# Patient Record
Sex: MALE | Race: WHITE | NOT HISPANIC OR LATINO | Employment: FULL TIME | URBAN - METROPOLITAN AREA
[De-identification: names, ages, dates, MRNs, and addresses within clinical notes are randomized per-mention and may not be internally consistent; named-entity substitution may affect disease eponyms.]

---

## 2018-09-02 ENCOUNTER — HOSPITAL ENCOUNTER (OUTPATIENT)
Facility: HOSPITAL | Age: 23
Discharge: HOME | End: 2018-09-03
Attending: EMERGENCY MEDICINE | Admitting: HOSPITALIST
Payer: MEDICAID

## 2018-09-02 DIAGNOSIS — S05.51XA INTRAOCULAR FOREIGN BODY OF RIGHT EYE, INITIAL ENCOUNTER: Primary | ICD-10-CM

## 2018-09-02 DIAGNOSIS — T15.91XA FOREIGN BODY, EYE, RIGHT, INITIAL ENCOUNTER: ICD-10-CM

## 2018-09-02 PROCEDURE — 90715 TDAP VACCINE 7 YRS/> IM: CPT | Performed by: STUDENT IN AN ORGANIZED HEALTH CARE EDUCATION/TRAINING PROGRAM

## 2018-09-02 PROCEDURE — G0378 HOSPITAL OBSERVATION PER HR: HCPCS

## 2018-09-02 PROCEDURE — 99284 EMERGENCY DEPT VISIT MOD MDM: CPT | Mod: ,,, | Performed by: EMERGENCY MEDICINE

## 2018-09-02 PROCEDURE — 63600175 PHARM REV CODE 636 W HCPCS: Performed by: STUDENT IN AN ORGANIZED HEALTH CARE EDUCATION/TRAINING PROGRAM

## 2018-09-02 PROCEDURE — 99220 PR INITIAL OBSERVATION CARE,LEVL III: CPT | Mod: ,,, | Performed by: PHYSICIAN ASSISTANT

## 2018-09-02 PROCEDURE — 90471 IMMUNIZATION ADMIN: CPT | Performed by: STUDENT IN AN ORGANIZED HEALTH CARE EDUCATION/TRAINING PROGRAM

## 2018-09-02 PROCEDURE — 99285 EMERGENCY DEPT VISIT HI MDM: CPT | Mod: 25

## 2018-09-02 PROCEDURE — 96365 THER/PROPH/DIAG IV INF INIT: CPT

## 2018-09-02 RX ORDER — ATROPINE SULFATE 10 MG/ML
1 SOLUTION/ DROPS OPHTHALMIC 2 TIMES DAILY
Status: DISCONTINUED | OUTPATIENT
Start: 2018-09-03 | End: 2018-09-03

## 2018-09-02 RX ORDER — ENOXAPARIN SODIUM 100 MG/ML
40 INJECTION SUBCUTANEOUS EVERY 24 HOURS
Status: DISCONTINUED | OUTPATIENT
Start: 2018-09-03 | End: 2018-09-03

## 2018-09-02 RX ADMIN — CLOSTRIDIUM TETANI TOXOID ANTIGEN (FORMALDEHYDE INACTIVATED), CORYNEBACTERIUM DIPHTHERIAE TOXOID ANTIGEN (FORMALDEHYDE INACTIVATED), BORDETELLA PERTUSSIS TOXOID ANTIGEN (GLUTARALDEHYDE INACTIVATED), BORDETELLA PERTUSSIS FILAMENTOUS HEMAGGLUTININ ANTIGEN (FORMALDEHYDE INACTIVATED), BORDETELLA PERTUSSIS PERTACTIN ANTIGEN, AND BORDETELLA PERTUSSIS FIMBRIAE 2/3 ANTIGEN 0.5 ML: 5; 2; 2.5; 5; 3; 5 INJECTION, SUSPENSION INTRAMUSCULAR at 10:09

## 2018-09-02 RX ADMIN — MOXIFLOXACIN HYDROCHLORIDE 400 MG: 400 INJECTION, SOLUTION INTRAVENOUS at 11:09

## 2018-09-03 ENCOUNTER — ANESTHESIA (OUTPATIENT)
Dept: SURGERY | Facility: HOSPITAL | Age: 23
End: 2018-09-03
Payer: MEDICAID

## 2018-09-03 ENCOUNTER — ANESTHESIA EVENT (OUTPATIENT)
Dept: SURGERY | Facility: HOSPITAL | Age: 23
End: 2018-09-03
Payer: MEDICAID

## 2018-09-03 VITALS
WEIGHT: 191.81 LBS | OXYGEN SATURATION: 98 % | BODY MASS INDEX: 22.65 KG/M2 | RESPIRATION RATE: 14 BRPM | DIASTOLIC BLOOD PRESSURE: 89 MMHG | TEMPERATURE: 100 F | HEIGHT: 77 IN | HEART RATE: 50 BPM | SYSTOLIC BLOOD PRESSURE: 148 MMHG

## 2018-09-03 PROBLEM — K21.9 GASTROESOPHAGEAL REFLUX DISEASE WITHOUT ESOPHAGITIS: Chronic | Status: ACTIVE | Noted: 2018-09-03

## 2018-09-03 PROBLEM — F17.210 CIGARETTE NICOTINE DEPENDENCE WITHOUT COMPLICATION: Chronic | Status: ACTIVE | Noted: 2018-09-03

## 2018-09-03 LAB
ALBUMIN SERPL BCP-MCNC: 3.8 G/DL
ALP SERPL-CCNC: 71 U/L
ALT SERPL W/O P-5'-P-CCNC: 43 U/L
ANION GAP SERPL CALC-SCNC: 9 MMOL/L
AST SERPL-CCNC: 26 U/L
BASOPHILS # BLD AUTO: 0.03 K/UL
BASOPHILS NFR BLD: 0.3 %
BILIRUB SERPL-MCNC: 0.5 MG/DL
BUN SERPL-MCNC: 7 MG/DL
CALCIUM SERPL-MCNC: 9.6 MG/DL
CHLORIDE SERPL-SCNC: 105 MMOL/L
CO2 SERPL-SCNC: 25 MMOL/L
CREAT SERPL-MCNC: 0.8 MG/DL
DIFFERENTIAL METHOD: ABNORMAL
EOSINOPHIL # BLD AUTO: 0.1 K/UL
EOSINOPHIL NFR BLD: 1.3 %
ERYTHROCYTE [DISTWIDTH] IN BLOOD BY AUTOMATED COUNT: 12.8 %
EST. GFR  (AFRICAN AMERICAN): >60 ML/MIN/1.73 M^2
EST. GFR  (NON AFRICAN AMERICAN): >60 ML/MIN/1.73 M^2
ESTIMATED AVG GLUCOSE: 94 MG/DL
GLUCOSE SERPL-MCNC: 94 MG/DL
GRAM STN SPEC: NORMAL
GRAM STN SPEC: NORMAL
HBA1C MFR BLD HPLC: 4.9 %
HCT VFR BLD AUTO: 39.1 %
HGB BLD-MCNC: 13.1 G/DL
IMM GRANULOCYTES # BLD AUTO: 0.03 K/UL
IMM GRANULOCYTES NFR BLD AUTO: 0.3 %
LYMPHOCYTES # BLD AUTO: 2.2 K/UL
LYMPHOCYTES NFR BLD: 21.7 %
MAGNESIUM SERPL-MCNC: 1.8 MG/DL
MCH RBC QN AUTO: 30.3 PG
MCHC RBC AUTO-ENTMCNC: 33.5 G/DL
MCV RBC AUTO: 91 FL
MONOCYTES # BLD AUTO: 1 K/UL
MONOCYTES NFR BLD: 9.9 %
NEUTROPHILS # BLD AUTO: 6.7 K/UL
NEUTROPHILS NFR BLD: 66.5 %
NRBC BLD-RTO: 0 /100 WBC
PHOSPHATE SERPL-MCNC: 4 MG/DL
PLATELET # BLD AUTO: 195 K/UL
PMV BLD AUTO: 11.7 FL
POTASSIUM SERPL-SCNC: 4 MMOL/L
PROT SERPL-MCNC: 6.9 G/DL
RBC # BLD AUTO: 4.32 M/UL
SODIUM SERPL-SCNC: 139 MMOL/L
WBC # BLD AUTO: 10.05 K/UL

## 2018-09-03 PROCEDURE — 25000003 PHARM REV CODE 250: Performed by: PHYSICIAN ASSISTANT

## 2018-09-03 PROCEDURE — 83036 HEMOGLOBIN GLYCOSYLATED A1C: CPT

## 2018-09-03 PROCEDURE — D9220A PRA ANESTHESIA: Mod: CRNA,,, | Performed by: NURSE ANESTHETIST, CERTIFIED REGISTERED

## 2018-09-03 PROCEDURE — 25000003 PHARM REV CODE 250: Performed by: NURSE ANESTHETIST, CERTIFIED REGISTERED

## 2018-09-03 PROCEDURE — 87102 FUNGUS ISOLATION CULTURE: CPT

## 2018-09-03 PROCEDURE — 27600004 OPTIME MED/SURG SUP & DEVICES INTRAOCULAR LENS: Performed by: OPHTHALMOLOGY

## 2018-09-03 PROCEDURE — 37000009 HC ANESTHESIA EA ADD 15 MINS: Performed by: OPHTHALMOLOGY

## 2018-09-03 PROCEDURE — 88300 SURGICAL PATH GROSS: CPT | Performed by: PATHOLOGY

## 2018-09-03 PROCEDURE — 36000707: Performed by: OPHTHALMOLOGY

## 2018-09-03 PROCEDURE — 27201423 OPTIME MED/SURG SUP & DEVICES STERILE SUPPLY: Performed by: OPHTHALMOLOGY

## 2018-09-03 PROCEDURE — C1814 RETINAL TAMP, SILICONE OIL: HCPCS | Performed by: OPHTHALMOLOGY

## 2018-09-03 PROCEDURE — D9220A PRA ANESTHESIA: Mod: ANES,,, | Performed by: ANESTHESIOLOGY

## 2018-09-03 PROCEDURE — 63600175 PHARM REV CODE 636 W HCPCS: Performed by: OPHTHALMOLOGY

## 2018-09-03 PROCEDURE — C1784 OCULAR DEV, INTRAOP, DET RET: HCPCS | Performed by: OPHTHALMOLOGY

## 2018-09-03 PROCEDURE — 37000008 HC ANESTHESIA 1ST 15 MINUTES: Performed by: OPHTHALMOLOGY

## 2018-09-03 PROCEDURE — 87116 MYCOBACTERIA CULTURE: CPT

## 2018-09-03 PROCEDURE — 71000033 HC RECOVERY, INTIAL HOUR: Performed by: OPHTHALMOLOGY

## 2018-09-03 PROCEDURE — 36415 COLL VENOUS BLD VENIPUNCTURE: CPT

## 2018-09-03 PROCEDURE — 87070 CULTURE OTHR SPECIMN AEROBIC: CPT

## 2018-09-03 PROCEDURE — 00145 ANES PX EYE VITREORTNL SURG: CPT | Performed by: OPHTHALMOLOGY

## 2018-09-03 PROCEDURE — 25000003 PHARM REV CODE 250: Performed by: STUDENT IN AN ORGANIZED HEALTH CARE EDUCATION/TRAINING PROGRAM

## 2018-09-03 PROCEDURE — 84100 ASSAY OF PHOSPHORUS: CPT

## 2018-09-03 PROCEDURE — 88300 SURGICAL PATH GROSS: CPT | Mod: 26,,, | Performed by: PATHOLOGY

## 2018-09-03 PROCEDURE — 63600175 PHARM REV CODE 636 W HCPCS: Performed by: PHYSICIAN ASSISTANT

## 2018-09-03 PROCEDURE — 65275 REPAIR OF EYE WOUND: CPT | Mod: 51,RT,, | Performed by: OPHTHALMOLOGY

## 2018-09-03 PROCEDURE — 83735 ASSAY OF MAGNESIUM: CPT

## 2018-09-03 PROCEDURE — 67113 REPAIR RETINAL DETACH CPLX: CPT | Mod: RT,,, | Performed by: OPHTHALMOLOGY

## 2018-09-03 PROCEDURE — 99226 PR SUBSEQUENT OBSERVATION CARE,LEVEL III: CPT | Mod: ,,, | Performed by: PHYSICIAN ASSISTANT

## 2018-09-03 PROCEDURE — 80053 COMPREHEN METABOLIC PANEL: CPT

## 2018-09-03 PROCEDURE — 87206 SMEAR FLUORESCENT/ACID STAI: CPT

## 2018-09-03 PROCEDURE — 63600175 PHARM REV CODE 636 W HCPCS: Performed by: NURSE ANESTHETIST, CERTIFIED REGISTERED

## 2018-09-03 PROCEDURE — 85025 COMPLETE CBC W/AUTO DIFF WBC: CPT

## 2018-09-03 PROCEDURE — 87075 CULTR BACTERIA EXCEPT BLOOD: CPT

## 2018-09-03 PROCEDURE — G0378 HOSPITAL OBSERVATION PER HR: HCPCS

## 2018-09-03 PROCEDURE — 25000003 PHARM REV CODE 250

## 2018-09-03 PROCEDURE — 65260 REMOVE FOREIGN BODY FROM EYE: CPT | Mod: 51,RT,, | Performed by: OPHTHALMOLOGY

## 2018-09-03 PROCEDURE — 25000003 PHARM REV CODE 250: Performed by: OPHTHALMOLOGY

## 2018-09-03 PROCEDURE — 36000706: Performed by: OPHTHALMOLOGY

## 2018-09-03 PROCEDURE — 87205 SMEAR GRAM STAIN: CPT

## 2018-09-03 RX ORDER — NEOMYCIN SULFATE, POLYMYXIN B SULFATE, AND DEXAMETHASONE 3.5; 10000; 1 MG/G; [USP'U]/G; MG/G
OINTMENT OPHTHALMIC
Status: DISCONTINUED | OUTPATIENT
Start: 2018-09-03 | End: 2018-09-03 | Stop reason: HOSPADM

## 2018-09-03 RX ORDER — NEOMYCIN SULFATE, POLYMYXIN B SULFATE, AND DEXAMETHASONE 3.5; 10000; 1 MG/G; [USP'U]/G; MG/G
OINTMENT OPHTHALMIC
Status: DISCONTINUED
Start: 2018-09-03 | End: 2018-09-03 | Stop reason: HOSPADM

## 2018-09-03 RX ORDER — MOXIFLOXACIN 5 MG/ML
SOLUTION/ DROPS OPHTHALMIC
Status: DISCONTINUED
Start: 2018-09-03 | End: 2018-09-03 | Stop reason: HOSPADM

## 2018-09-03 RX ORDER — PROPOFOL 10 MG/ML
VIAL (ML) INTRAVENOUS
Status: DISCONTINUED | OUTPATIENT
Start: 2018-09-03 | End: 2018-09-03

## 2018-09-03 RX ORDER — SODIUM CHLORIDE 0.9 % (FLUSH) 0.9 %
5 SYRINGE (ML) INJECTION
Status: DISCONTINUED | OUTPATIENT
Start: 2018-09-03 | End: 2018-09-03 | Stop reason: HOSPADM

## 2018-09-03 RX ORDER — HYDROCODONE BITARTRATE AND ACETAMINOPHEN 5; 325 MG/1; MG/1
TABLET ORAL
Status: COMPLETED
Start: 2018-09-03 | End: 2018-09-03

## 2018-09-03 RX ORDER — TETRACAINE HYDROCHLORIDE 5 MG/ML
SOLUTION OPHTHALMIC
Status: DISCONTINUED
Start: 2018-09-03 | End: 2018-09-03 | Stop reason: WASHOUT

## 2018-09-03 RX ORDER — NEOSTIGMINE METHYLSULFATE 1 MG/ML
INJECTION, SOLUTION INTRAVENOUS
Status: DISCONTINUED | OUTPATIENT
Start: 2018-09-03 | End: 2018-09-03

## 2018-09-03 RX ORDER — EPINEPHRINE 1 MG/ML
INJECTION, SOLUTION INTRACARDIAC; INTRAMUSCULAR; INTRAVENOUS; SUBCUTANEOUS
Status: DISCONTINUED | OUTPATIENT
Start: 2018-09-03 | End: 2018-09-03 | Stop reason: HOSPADM

## 2018-09-03 RX ORDER — GLYCOPYRROLATE 0.2 MG/ML
INJECTION INTRAMUSCULAR; INTRAVENOUS
Status: DISCONTINUED | OUTPATIENT
Start: 2018-09-03 | End: 2018-09-03

## 2018-09-03 RX ORDER — MIDAZOLAM HYDROCHLORIDE 1 MG/ML
INJECTION, SOLUTION INTRAMUSCULAR; INTRAVENOUS
Status: DISCONTINUED | OUTPATIENT
Start: 2018-09-03 | End: 2018-09-03

## 2018-09-03 RX ORDER — PHENYLEPHRINE HYDROCHLORIDE 25 MG/ML
1 SOLUTION/ DROPS OPHTHALMIC
Status: DISCONTINUED | OUTPATIENT
Start: 2018-09-03 | End: 2018-09-03 | Stop reason: HOSPADM

## 2018-09-03 RX ORDER — IBUPROFEN 200 MG
24 TABLET ORAL
Status: DISCONTINUED | OUTPATIENT
Start: 2018-09-03 | End: 2018-09-03 | Stop reason: HOSPADM

## 2018-09-03 RX ORDER — SUCRALFATE 1 G/1
1 TABLET ORAL 4 TIMES DAILY
Status: DISCONTINUED | OUTPATIENT
Start: 2018-09-03 | End: 2018-09-03 | Stop reason: HOSPADM

## 2018-09-03 RX ORDER — AMOXICILLIN 250 MG
1 CAPSULE ORAL 2 TIMES DAILY
Status: DISCONTINUED | OUTPATIENT
Start: 2018-09-03 | End: 2018-09-03 | Stop reason: HOSPADM

## 2018-09-03 RX ORDER — MOXIFLOXACIN HYDROCHLORIDE 400 MG/1
400 TABLET ORAL DAILY
Qty: 14 TABLET | Refills: 0 | Status: SHIPPED | OUTPATIENT
Start: 2018-09-03 | End: 2018-09-10 | Stop reason: SDUPTHER

## 2018-09-03 RX ORDER — GLUCAGON 1 MG
1 KIT INJECTION
Status: DISCONTINUED | OUTPATIENT
Start: 2018-09-03 | End: 2018-09-03 | Stop reason: HOSPADM

## 2018-09-03 RX ORDER — OXYCODONE AND ACETAMINOPHEN 5; 325 MG/1; MG/1
1 TABLET ORAL EVERY 6 HOURS PRN
Qty: 12 TABLET | Refills: 0 | Status: SHIPPED | OUTPATIENT
Start: 2018-09-03 | End: 2018-09-10 | Stop reason: SDUPTHER

## 2018-09-03 RX ORDER — ONDANSETRON 4 MG/1
4 TABLET, FILM COATED ORAL EVERY 8 HOURS PRN
Qty: 12 TABLET | Refills: 0 | OUTPATIENT
Start: 2018-09-03 | End: 2018-09-10

## 2018-09-03 RX ORDER — DEXAMETHASONE SODIUM PHOSPHATE 4 MG/ML
INJECTION, SOLUTION INTRA-ARTICULAR; INTRALESIONAL; INTRAMUSCULAR; INTRAVENOUS; SOFT TISSUE
Status: DISCONTINUED | OUTPATIENT
Start: 2018-09-03 | End: 2018-09-03 | Stop reason: HOSPADM

## 2018-09-03 RX ORDER — ONDANSETRON 8 MG/1
8 TABLET, ORALLY DISINTEGRATING ORAL EVERY 8 HOURS PRN
Status: DISCONTINUED | OUTPATIENT
Start: 2018-09-03 | End: 2018-09-03 | Stop reason: HOSPADM

## 2018-09-03 RX ORDER — ACETAMINOPHEN 325 MG/1
650 TABLET ORAL EVERY 4 HOURS PRN
Status: DISCONTINUED | OUTPATIENT
Start: 2018-09-03 | End: 2018-09-03

## 2018-09-03 RX ORDER — LIDOCAINE HYDROCHLORIDE 20 MG/ML
INJECTION, SOLUTION INFILTRATION; PERINEURAL
Status: DISCONTINUED
Start: 2018-09-03 | End: 2018-09-03 | Stop reason: WASHOUT

## 2018-09-03 RX ORDER — FENTANYL CITRATE 50 UG/ML
INJECTION, SOLUTION INTRAMUSCULAR; INTRAVENOUS
Status: DISCONTINUED | OUTPATIENT
Start: 2018-09-03 | End: 2018-09-03

## 2018-09-03 RX ORDER — IPRATROPIUM BROMIDE AND ALBUTEROL SULFATE 2.5; .5 MG/3ML; MG/3ML
3 SOLUTION RESPIRATORY (INHALATION) EVERY 4 HOURS PRN
Status: DISCONTINUED | OUTPATIENT
Start: 2018-09-03 | End: 2018-09-03 | Stop reason: HOSPADM

## 2018-09-03 RX ORDER — RAMELTEON 8 MG/1
8 TABLET ORAL NIGHTLY PRN
Status: DISCONTINUED | OUTPATIENT
Start: 2018-09-03 | End: 2018-09-03 | Stop reason: HOSPADM

## 2018-09-03 RX ORDER — FENTANYL CITRATE 50 UG/ML
25 INJECTION, SOLUTION INTRAMUSCULAR; INTRAVENOUS EVERY 5 MIN PRN
Status: DISCONTINUED | OUTPATIENT
Start: 2018-09-03 | End: 2018-09-03 | Stop reason: HOSPADM

## 2018-09-03 RX ORDER — OXYCODONE AND ACETAMINOPHEN 5; 325 MG/1; MG/1
1 TABLET ORAL EVERY 6 HOURS PRN
Qty: 12 TABLET | Refills: 0 | Status: SHIPPED | OUTPATIENT
Start: 2018-09-03 | End: 2018-09-10 | Stop reason: ALTCHOICE

## 2018-09-03 RX ORDER — SUCCINYLCHOLINE CHLORIDE 20 MG/ML
INJECTION INTRAMUSCULAR; INTRAVENOUS
Status: DISCONTINUED | OUTPATIENT
Start: 2018-09-03 | End: 2018-09-03

## 2018-09-03 RX ORDER — DEXAMETHASONE SODIUM PHOSPHATE 4 MG/ML
INJECTION, SOLUTION INTRA-ARTICULAR; INTRALESIONAL; INTRAMUSCULAR; INTRAVENOUS; SOFT TISSUE
Status: DISCONTINUED
Start: 2018-09-03 | End: 2018-09-03 | Stop reason: HOSPADM

## 2018-09-03 RX ORDER — KETOROLAC TROMETHAMINE 30 MG/ML
15 INJECTION, SOLUTION INTRAMUSCULAR; INTRAVENOUS EVERY 6 HOURS PRN
Status: DISCONTINUED | OUTPATIENT
Start: 2018-09-03 | End: 2018-09-03 | Stop reason: HOSPADM

## 2018-09-03 RX ORDER — PANTOPRAZOLE SODIUM 40 MG/1
40 TABLET, DELAYED RELEASE ORAL DAILY
Status: DISCONTINUED | OUTPATIENT
Start: 2018-09-03 | End: 2018-09-03 | Stop reason: HOSPADM

## 2018-09-03 RX ORDER — LIDOCAINE HYDROCHLORIDE 20 MG/ML
INJECTION, SOLUTION EPIDURAL; INFILTRATION; INTRACAUDAL; PERINEURAL
Status: DISCONTINUED
Start: 2018-09-03 | End: 2018-09-03 | Stop reason: WASHOUT

## 2018-09-03 RX ORDER — HYDROCODONE BITARTRATE AND ACETAMINOPHEN 5; 325 MG/1; MG/1
1 TABLET ORAL EVERY 4 HOURS PRN
Status: DISCONTINUED | OUTPATIENT
Start: 2018-09-03 | End: 2018-09-03 | Stop reason: HOSPADM

## 2018-09-03 RX ORDER — VANCOMYCIN HYDROCHLORIDE 500 MG/10ML
INJECTION, POWDER, LYOPHILIZED, FOR SOLUTION INTRAVENOUS
Status: DISCONTINUED | OUTPATIENT
Start: 2018-09-03 | End: 2018-09-03 | Stop reason: HOSPADM

## 2018-09-03 RX ORDER — PHENYLEPHRINE HYDROCHLORIDE 100 MG/ML
SOLUTION/ DROPS OPHTHALMIC
Status: DISCONTINUED
Start: 2018-09-03 | End: 2018-09-03 | Stop reason: HOSPADM

## 2018-09-03 RX ORDER — LIDOCAINE HYDROCHLORIDE 10 MG/ML
1 INJECTION, SOLUTION EPIDURAL; INFILTRATION; INTRACAUDAL; PERINEURAL ONCE
Status: DISCONTINUED | OUTPATIENT
Start: 2018-09-03 | End: 2018-09-03 | Stop reason: HOSPADM

## 2018-09-03 RX ORDER — PROMETHAZINE HYDROCHLORIDE 25 MG/1
25 TABLET ORAL EVERY 6 HOURS PRN
Status: DISCONTINUED | OUTPATIENT
Start: 2018-09-03 | End: 2018-09-03 | Stop reason: HOSPADM

## 2018-09-03 RX ORDER — ONDANSETRON 4 MG/1
4 TABLET, FILM COATED ORAL EVERY 8 HOURS PRN
Qty: 12 TABLET | Refills: 0 | Status: SHIPPED | OUTPATIENT
Start: 2018-09-03 | End: 2018-09-10

## 2018-09-03 RX ORDER — TROPICAMIDE 5 MG/ML
SOLUTION/ DROPS OPHTHALMIC
Status: DISCONTINUED
Start: 2018-09-03 | End: 2018-09-03 | Stop reason: HOSPADM

## 2018-09-03 RX ORDER — PREDNISOLONE ACETATE 10 MG/ML
SUSPENSION/ DROPS OPHTHALMIC
Status: DISCONTINUED
Start: 2018-09-03 | End: 2018-09-03 | Stop reason: HOSPADM

## 2018-09-03 RX ORDER — EPINEPHRINE 1 MG/ML
INJECTION, SOLUTION INTRACARDIAC; INTRAMUSCULAR; INTRAVENOUS; SUBCUTANEOUS
Status: DISCONTINUED
Start: 2018-09-03 | End: 2018-09-03 | Stop reason: HOSPADM

## 2018-09-03 RX ORDER — TROPICAMIDE 10 MG/ML
1 SOLUTION/ DROPS OPHTHALMIC
Status: DISCONTINUED | OUTPATIENT
Start: 2018-09-03 | End: 2018-09-03 | Stop reason: HOSPADM

## 2018-09-03 RX ORDER — LIDOCAINE HYDROCHLORIDE 10 MG/ML
INJECTION INFILTRATION; PERINEURAL
Status: DISCONTINUED
Start: 2018-09-03 | End: 2018-09-03 | Stop reason: WASHOUT

## 2018-09-03 RX ORDER — VANCOMYCIN HYDROCHLORIDE 1 G/20ML
INJECTION, POWDER, LYOPHILIZED, FOR SOLUTION INTRAVENOUS
Status: DISCONTINUED
Start: 2018-09-03 | End: 2018-09-03 | Stop reason: WASHOUT

## 2018-09-03 RX ORDER — IBUPROFEN 200 MG
16 TABLET ORAL
Status: DISCONTINUED | OUTPATIENT
Start: 2018-09-03 | End: 2018-09-03 | Stop reason: HOSPADM

## 2018-09-03 RX ORDER — ACETAMINOPHEN 325 MG/1
650 TABLET ORAL EVERY 4 HOURS PRN
Status: DISCONTINUED | OUTPATIENT
Start: 2018-09-03 | End: 2018-09-03 | Stop reason: HOSPADM

## 2018-09-03 RX ORDER — VANCOMYCIN HYDROCHLORIDE 500 MG/10ML
INJECTION, POWDER, LYOPHILIZED, FOR SOLUTION INTRAVENOUS
Status: DISCONTINUED
Start: 2018-09-03 | End: 2018-09-03 | Stop reason: HOSPADM

## 2018-09-03 RX ORDER — BUPIVACAINE HYDROCHLORIDE 7.5 MG/ML
INJECTION, SOLUTION EPIDURAL; RETROBULBAR
Status: DISCONTINUED
Start: 2018-09-03 | End: 2018-09-03 | Stop reason: WASHOUT

## 2018-09-03 RX ORDER — MOXIFLOXACIN HYDROCHLORIDE 400 MG/1
400 TABLET ORAL DAILY
Qty: 10 TABLET | Refills: 0 | Status: SHIPPED | OUTPATIENT
Start: 2018-09-03 | End: 2018-09-13

## 2018-09-03 RX ORDER — DEXAMETHASONE SODIUM PHOSPHATE 4 MG/ML
INJECTION, SOLUTION INTRA-ARTICULAR; INTRALESIONAL; INTRAMUSCULAR; INTRAVENOUS; SOFT TISSUE
Status: DISCONTINUED | OUTPATIENT
Start: 2018-09-03 | End: 2018-09-03

## 2018-09-03 RX ORDER — ONDANSETRON 8 MG/1
8 TABLET, ORALLY DISINTEGRATING ORAL EVERY 8 HOURS PRN
Status: DISCONTINUED | OUTPATIENT
Start: 2018-09-03 | End: 2018-09-03

## 2018-09-03 RX ORDER — ROCURONIUM BROMIDE 10 MG/ML
INJECTION, SOLUTION INTRAVENOUS
Status: DISCONTINUED | OUTPATIENT
Start: 2018-09-03 | End: 2018-09-03

## 2018-09-03 RX ORDER — MORPHINE SULFATE 20 MG/ML
10 SOLUTION ORAL EVERY 6 HOURS PRN
Status: DISCONTINUED | OUTPATIENT
Start: 2018-09-03 | End: 2018-09-03 | Stop reason: HOSPADM

## 2018-09-03 RX ORDER — ONDANSETRON 2 MG/ML
INJECTION INTRAMUSCULAR; INTRAVENOUS
Status: DISCONTINUED | OUTPATIENT
Start: 2018-09-03 | End: 2018-09-03

## 2018-09-03 RX ORDER — LIDOCAINE HCL/PF 100 MG/5ML
SYRINGE (ML) INTRAVENOUS
Status: DISCONTINUED | OUTPATIENT
Start: 2018-09-03 | End: 2018-09-03

## 2018-09-03 RX ORDER — TETRACAINE HYDROCHLORIDE 5 MG/ML
1 SOLUTION OPHTHALMIC
Status: DISCONTINUED | OUTPATIENT
Start: 2018-09-03 | End: 2018-09-03 | Stop reason: HOSPADM

## 2018-09-03 RX ORDER — IBUPROFEN 200 MG
1 TABLET ORAL DAILY PRN
Status: DISCONTINUED | OUTPATIENT
Start: 2018-09-03 | End: 2018-09-03 | Stop reason: HOSPADM

## 2018-09-03 RX ADMIN — HYDROCODONE BITARTRATE AND ACETAMINOPHEN 1 TABLET: 5; 325 TABLET ORAL at 01:09

## 2018-09-03 RX ADMIN — LIDOCAINE HYDROCHLORIDE 80 MG: 20 INJECTION, SOLUTION INTRAVENOUS at 11:09

## 2018-09-03 RX ADMIN — SENNOSIDES AND DOCUSATE SODIUM 1 TABLET: 8.6; 5 TABLET ORAL at 01:09

## 2018-09-03 RX ADMIN — FENTANYL CITRATE 25 MCG: 50 INJECTION, SOLUTION INTRAMUSCULAR; INTRAVENOUS at 01:09

## 2018-09-03 RX ADMIN — GLYCOPYRROLATE 0.4 MG: 0.2 INJECTION INTRAMUSCULAR; INTRAVENOUS at 12:09

## 2018-09-03 RX ADMIN — MORPHINE SULFATE 10 MG: 100 SOLUTION ORAL at 01:09

## 2018-09-03 RX ADMIN — PROPOFOL 200 MG: 10 INJECTION, EMULSION INTRAVENOUS at 11:09

## 2018-09-03 RX ADMIN — FENTANYL CITRATE 25 MCG: 50 INJECTION, SOLUTION INTRAMUSCULAR; INTRAVENOUS at 11:09

## 2018-09-03 RX ADMIN — DEXAMETHASONE SODIUM PHOSPHATE 8 MG: 4 INJECTION, SOLUTION INTRAMUSCULAR; INTRAVENOUS at 11:09

## 2018-09-03 RX ADMIN — KETOROLAC TROMETHAMINE 15 MG: 30 INJECTION, SOLUTION INTRAMUSCULAR at 08:09

## 2018-09-03 RX ADMIN — SUCCINYLCHOLINE CHLORIDE 100 MG: 20 INJECTION, SOLUTION INTRAMUSCULAR; INTRAVENOUS at 11:09

## 2018-09-03 RX ADMIN — ONDANSETRON 4 MG: 2 INJECTION INTRAMUSCULAR; INTRAVENOUS at 12:09

## 2018-09-03 RX ADMIN — ROCURONIUM BROMIDE 10 MG: 10 INJECTION, SOLUTION INTRAVENOUS at 11:09

## 2018-09-03 RX ADMIN — NEOSTIGMINE METHYLSULFATE 3 MG: 1 INJECTION INTRAVENOUS at 12:09

## 2018-09-03 RX ADMIN — MIDAZOLAM HYDROCHLORIDE 2 MG: 1 INJECTION, SOLUTION INTRAMUSCULAR; INTRAVENOUS at 10:09

## 2018-09-03 RX ADMIN — SODIUM CHLORIDE, SODIUM GLUCONATE, SODIUM ACETATE, POTASSIUM CHLORIDE, MAGNESIUM CHLORIDE, SODIUM PHOSPHATE, DIBASIC, AND POTASSIUM PHOSPHATE: .53; .5; .37; .037; .03; .012; .00082 INJECTION, SOLUTION INTRAVENOUS at 10:09

## 2018-09-03 RX ADMIN — FENTANYL CITRATE 25 MCG: 50 INJECTION, SOLUTION INTRAMUSCULAR; INTRAVENOUS at 10:09

## 2018-09-03 RX ADMIN — KETOROLAC TROMETHAMINE 15 MG: 30 INJECTION, SOLUTION INTRAMUSCULAR at 02:09

## 2018-09-03 NOTE — ED TRIAGE NOTES
Pt reports to ED via EMS with c/o right eye injury. Pt reports metal got in his eye at work Friday. Pt transfer from [facility] and received 2mg morphine, 1mg Dilaudid. Pt report no vision in right eye, pain 4/10. PT denies h/a.

## 2018-09-03 NOTE — H&P
Pre-Operative History & Physical  Ophthalmology      SUBJECTIVE:     History of Present Illness:  Patient is a 23 y.o. male presents with Intraocular foreign body of right eye, initial encounter [S05.51XA].    MEDICATIONS:   (Not in a hospital admission)    ALLERGIES: Review of patient's allergies indicates:  No Known Allergies    PAST MEDICAL HISTORY: History reviewed. No pertinent past medical history.  PAST SURGICAL HISTORY:   Past Surgical History:   Procedure Laterality Date    LYMPH NODE BIOPSY      benign    TONSILLECTOMY       PAST FAMILY HISTORY:   Family History   Problem Relation Age of Onset    Cancer Maternal Grandfather         lung cancer, also brain involvement     SOCIAL HISTORY:   Social History     Tobacco Use    Smoking status: Current Every Day Smoker     Packs/day: 1.00    Smokeless tobacco: Current User   Substance Use Topics    Alcohol use: No    Drug use: No        MENTAL STATUS: Alert    REVIEW OF SYSTEMS: Negative    OBJECTIVE:     Vital Signs (Most Recent)  Temp: 98.4 °F (36.9 °C) (09/02/18 2123)  Pulse: 109 (09/02/18 2123)  Resp: 18 (09/02/18 2123)  BP: (!) 128/102 (09/02/18 2123)  SpO2: 98 % (09/02/18 2123)    Physical Exam:  General: NAD  HEENT: see clinic note for full details  Lungs: Adequate respirations  Heart: + pulses  Abdomen: Soft    ASSESSMENT/PLAN:     Patient is a 23 y.o. male with Intraocular foreign body of right eye, initial encounter [S05.51XA].     - Plan for PPV/PPL/FB removal OD    - Risks/benefits/alternatives of the procedure including, but not limited to scarring, bleeding, infection, loss or decreased vision, and/or need for possible repeat surgery discussed with the patient and family.   - Informed consent obtained prior to surgery and the patient/family voiced good understanding.

## 2018-09-03 NOTE — PROGRESS NOTES
[facility] Hospital Medicine  Progress Note    Patient Name: Nirmal Oglesby  MRN: [id number]  Patient Class: OP- Observation   Admission Date: 9/2/2018  Length of Stay: 0 days  Attending Physician: Lamar Ramos MD  Primary Care Provider: Octavia Best MD    [facility] Medicine Team: [facility] E Isela Valdes PA-C    Subjective:     Principal Problem:Intraocular foreign body of right eye    HPI:  Patient is a 23 year old gentleman with a h/o tobacco abuse.  He was transferred from [facility] secondary to an intraocular foreign body.  Patient states that he was hammering on Friday and felt something go into his right eye.  He attempted to irrigate it afterwards.  Initially, eye was painful and patient noted blurred vision.  This morning, vision in right eye worsened to complete darkness.  He currently complains of mild pain to the eye.  He denies chest pain, SOB, dizziness, palpitations, fever/chills, N/V/D.  Patient does report a history of a brain mass about four years ago, but did not follow-up.    Hospital Course:  Nirmal Oglesby was placed in observation for acute vision loss due to intraocular foreign body. Opthalmology to perform surgery 9/3/2018.    Interval History: Pt. Resting in bed, family at bedside. All questions and concerns addressed.     Review of Systems   Constitutional: Negative for chills and fever.   HENT: Negative for congestion.    Eyes: Positive for visual disturbance. Negative for pain.        R vision loss   Cardiovascular: Negative for chest pain.   Gastrointestinal: Negative for abdominal distention and abdominal pain.   Genitourinary: Negative for difficulty urinating and dysuria.   Musculoskeletal: Negative for arthralgias and back pain.   Allergic/Immunologic: Negative for immunocompromised state.   Neurological: Negative for dizziness, weakness and headaches.   Psychiatric/Behavioral: Negative for agitation.     Objective:     Vital Signs  (Most Recent):  Temp: 96.7 °F (35.9 °C) (09/03/18 0753)  Pulse: (!) 48 (09/03/18 0753)  Resp: 14 (09/03/18 0753)  BP: 130/68 (09/03/18 0753)  SpO2: 99 % (09/03/18 0753) Vital Signs (24h Range):  Temp:  [96.7 °F (35.9 °C)-98.5 °F (36.9 °C)] 96.7 °F (35.9 °C)  Pulse:  [] 48  Resp:  [14-18] 14  SpO2:  [97 %-100 %] 99 %  BP: (123-139)/() 130/68     Weight: 87 kg (191 lb 12.8 oz)  Body mass index is 22.74 kg/m².    Intake/Output Summary (Last 24 hours) at 9/3/2018 0811  Last data filed at 9/3/2018 0015  Gross per 24 hour   Intake 250 ml   Output --   Net 250 ml      Physical Exam   Constitutional: He is oriented to person, place, and time. He appears well-developed and well-nourished. No distress.   HENT:   Head: Normocephalic and atraumatic.   Eyes:   Right eye shield in place   Neck: Neck supple. Carotid bruit is not present. No thyromegaly present.   Cardiovascular: Normal rate and regular rhythm. Exam reveals no gallop.   No murmur heard.  Pulmonary/Chest: Effort normal and breath sounds normal. No respiratory distress. He has no wheezes.   Abdominal: Bowel sounds are normal. He exhibits no distension. There is no splenomegaly or hepatomegaly. There is no tenderness.   Musculoskeletal: Normal range of motion. He exhibits no edema.   Neurological: He is alert and oriented to person, place, and time. No cranial nerve deficit or sensory deficit.   Skin: Skin is warm and dry. No rash noted.   Psychiatric: He has a normal mood and affect. His behavior is normal.   Nursing note and vitals reviewed.      Significant Labs: All pertinent labs within the past 24 hours have been reviewed.    Significant Imaging: I have reviewed all pertinent imaging results/findings within the past 24 hours.    Assessment/Plan:      * Intraocular foreign body of right eye    Vitrectomy today per Opthalmology   - CT showed intraocular metallic foreign body.  - Appreciate ophthalmology's assistance.   - NPO, Atropine BID right eye, IV  moxifloxacin.  - Patient's tetanus updated in ER.  - Supportive care.        Gastroesophageal reflux disease without esophagitis    - Continue Protonix 20mg daily and sucralfate 1g QID.          Cigarette nicotine dependence without complication    Pt reports 1-2 pks/day, not interested in quitting at this time  - Complete cessation recommended.  Nicoderm patch PRN.          VTE Risk Mitigation (From admission, onward)    None              Isela Valdes PA-C  Department of Hospital Medicine   [facility]

## 2018-09-03 NOTE — ASSESSMENT & PLAN NOTE
Vitrectomy today per Opthalmology   - CT showed intraocular metallic foreign body.  - Appreciate ophthalmology's assistance.   - NPO, Atropine BID right eye, IV moxifloxacin.  - Patient's tetanus updated in ER.  - Supportive care.

## 2018-09-03 NOTE — ANESTHESIA POSTPROCEDURE EVALUATION
"Anesthesia Post Evaluation    Patient: Nirmal Oglesby    Procedure(s) Performed: Procedure(s) (LRB):  VITRECTOMY, PARS PLANA APPROACH (Right)    Final Anesthesia Type: general  Patient location during evaluation: PACU  Patient participation: Yes- Able to Participate  Level of consciousness: awake and alert  Post-procedure vital signs: reviewed and stable  Pain management: adequate  Airway patency: patent  PONV status at discharge: No PONV  Anesthetic complications: no      Cardiovascular status: blood pressure returned to baseline and hemodynamically stable  Respiratory status: unassisted and spontaneous ventilation  Hydration status: euvolemic  Follow-up not needed.        Visit Vitals  BP (!) 148/89   Pulse (!) 50   Temp 37.7 °C (99.9 °F) (Temporal)   Resp 14   Ht 6' 5" (1.956 m)   Wt 87 kg (191 lb 12.8 oz)   SpO2 98%   BMI 22.74 kg/m²       Pain/Casey Score: Pain Assessment Performed: Yes (9/3/2018  1:30 PM)  Presence of Pain: complains of pain/discomfort (9/3/2018  1:30 PM)  Pain Rating Prior to Med Admin: 10 (9/3/2018  2:56 PM)  Pain Rating Post Med Admin: 4 (9/3/2018  2:00 PM)  Casey Score: 9 (9/3/2018  1:30 PM)        "

## 2018-09-03 NOTE — TRANSFER OF CARE
"Anesthesia Transfer of Care Note    Patient: Nirmal Oglesby    Procedure(s) Performed: Procedure(s) (LRB):  VITRECTOMY, PARS PLANA APPROACH (Right)    Patient location: PACU    Anesthesia Type: general    Transport from OR: Transported from OR on 6-10 L/min O2 by face mask with adequate spontaneous ventilation    Post pain: pain needs to be addressed    Post assessment: no apparent anesthetic complications and tolerated procedure well    Post vital signs: stable    Level of consciousness: awake    Nausea/Vomiting: no nausea/vomiting    Complications: none    Transfer of care protocol was followed    PIV became dislodged prior to transport. CRNA placed new IV on arrival to PACU; see LDA. Fentanyl 50 mcg administered by CRNA once new PIV in place.    Last vitals:   Visit Vitals  BP (!) 123/58 (BP Location: Left arm, Patient Position: Lying)   Pulse 64   Temp 37.7 °C (99.9 °F) (Temporal)   Resp 14   Ht 6' 5" (1.956 m)   Wt 87 kg (191 lb 12.8 oz)   SpO2 96%   BMI 22.74 kg/m²     "

## 2018-09-03 NOTE — HOSPITAL COURSE
Nirmal Oglesby was placed in observation for acute vision loss due to intraocular foreign body. Opthalmology to perform surgery 9/3/2018.

## 2018-09-03 NOTE — CONSULTS
[facility]  Ophthalmology  Consult Note    Patient Name: Nirmal Oglesby  MRN: [id number]  Admission Date: 9/2/2018  Hospital Length of Stay: 0 days  Attending Provider: Mina Julian MD   Primary Care Physician: Octavia Best MD  Principal Problem:<principal problem not specified>    Inpatient consult to Ophthalmology  Consult performed by: Kenyatta Mcintosh MD  Consult ordered by: Mina Julian MD        Subjective:     Chief Complaint:  Loss of vision OD    HPI:   Mr. Oglesby is a 22 y/o male who presents to [facility] as a transfer from [facility] with a history of Intraocular foreign body as seen on CT from OSH. Pt was hammering a hammer (unknown composition) and felt something go into his eye. No eye protection. Attempted to irrigate immediately after. Reports blurry vision immediately following the incident that worsened to complete darkness this morning.     Last meal at noon, last drank at 4 pm Coke   NKDA  PMHx: HTN diet controlled, brain mass that is being observed (?)  POHx: none, no history of eye crossing or diplopia    No new subjective & objective note has been filed under this hospital service since the last note was generated.      Base Eye Exam     Visual Acuity (Snellen - Linear)       Right Left    Dist sc LP 20/25          Tonometry (Tonopen, 10:17 PM)       Right Left    Pressure 7 9          Pupils       Dark Light Shape React APD    Right 3.5 3 Round Minimal 1+    Left 4 2 Round Brisk None          Extraocular Movement       Right Left     -- -- --   0  0   -- -1 --    0 0 0   0  0   0 0 0      RXT           Dilation     Both eyes:  1.0% Mydriacyl, 0.5% Mydriacyl @ 10:11 PM            Slit Lamp and Fundus Exam     External Exam       Right Left    External Normal Normal          Slit Lamp Exam       Right Left    Lids/Lashes trace edema, TTP, mild erythema Normal    Conjunctiva/Sclera 2+ injection White and quiet    Cornea Sealed laceration at 9 oclock near  limbus, Hermelinda negative, MCE temporally Clear    Anterior Chamber 3+ pigmented cells Deep and quiet    Iris Iris defect at 9 oclock Round and reactive    Lens traumatic cataract in central axis Clear    Vitreous No view Normal          Fundus Exam       Right Left    Disc No view Normal    C/D Ratio  0.1    Macula No view Normal    Vessels No view Normal    Periphery No view Normal              Assessment and Plan:     Intraocular foreign body of right eye    - 22 yo M presenting with loss of vision OD and eye pain OD x 2 days after working with metal at 9 am on 8/31  - Seen initially at [facility] and CT scan revealed intraocular metallic foreign body. Globes formed  - [facility] LP, IOP 7, minimal pupil reaction with +APD  - Anterior exam shows entry wound at 9 oclock near limbus, 3+ AC rxn, with traumatic cataract  - No view to posterior chamber d/t cataract  - Given LP status, recommend urgent removal of IOFB and patient amenable to surgery  - Informed consent obtained after extensive risks/benefits/alternatives were discussed with the patient including but not limited to pain, bleeding, infection, loss of vision, loss of the eye, asymmetry, need for revision in future, scarring.  Alternatives such as observation were discussed.  All questions were answered.    - Update tetanus  - Start IV moxifloxacin   - Admit to obs  - Eye shield applied  - NPO after midnight  - Atropine BID right eye    To OR in AM              Seen with Dr. Caballero    Thank you for your consult. I will follow-up with patient. Please contact us if you have any additional questions.    Kenyatta Mcintosh MD  Ophthalmology  [facility]

## 2018-09-03 NOTE — PROGRESS NOTES
Patient arrived to floor from ED via wheelchair. Patient is awake, alert, and oriented. Skin is warm and dry. Neurovascularly intact. Patch noted over right eye, and patient complains of discomfort to the right eye. Lungs clear in all fields. Abdomen is soft and nontender. Bowel sounds present in all quadrants. NPO status in progress as patient is aware that he is going to surgery today. Questions encouraged. Oriented to environment. Will continue to monitor.

## 2018-09-03 NOTE — SUBJECTIVE & OBJECTIVE
History reviewed. No pertinent past medical history.    Past Surgical History:   Procedure Laterality Date    LYMPH NODE BIOPSY      benign    TONSILLECTOMY         Review of patient's allergies indicates:  No Known Allergies    Current Facility-Administered Medications on File Prior to Encounter   Medication    [COMPLETED] fluorescein ophthalmic strip 1 each    [COMPLETED] HYDROmorphone injection 1 mg    [COMPLETED] morphine injection 4 mg    [COMPLETED] omnipaque 350 iohexol 80 mL    [COMPLETED] ondansetron injection 4 mg    [COMPLETED] tetracaine HCl (PF) 0.5 % Drop 1 drop     Current Outpatient Medications on File Prior to Encounter   Medication Sig    ondansetron (ZOFRAN) 4 MG tablet Take 8 mg by mouth 2 (two) times daily.    pantoprazole (PROTONIX) 20 MG tablet Take 20 mg by mouth once daily.    promethazine (PHENERGAN) 25 MG tablet Take 1 tablet (25 mg total) by mouth every 6 (six) hours as needed for Nausea.    sucralfate (CARAFATE) 1 gram tablet Take 1 g by mouth 4 (four) times daily.    diazepam (VALIUM) 10 MG Tab Take 1 tablet (10 mg total) by mouth as needed (take prior to MRI). Take 2 tabs PO prior to MRI, 1 PO extra if needed     Family History     Problem Relation (Age of Onset)    Cancer Maternal Grandfather        Tobacco Use    Smoking status: Current Every Day Smoker     Packs/day: 1.00    Smokeless tobacco: Current User   Substance and Sexual Activity    Alcohol use: No    Drug use: No    Sexual activity: Yes     Partners: Female     Review of Systems   Constitutional: Negative for activity change, appetite change, chills, diaphoresis, fatigue, fever and unexpected weight change.   HENT: Negative for congestion, rhinorrhea, sore throat, trouble swallowing and voice change.    Eyes: Positive for visual disturbance.   Respiratory: Negative for cough, choking, chest tightness, shortness of breath and wheezing.    Cardiovascular: Negative for chest pain, palpitations and leg  swelling.   Gastrointestinal: Negative for abdominal distention, abdominal pain, anal bleeding, blood in stool, constipation, diarrhea, nausea and vomiting.   Endocrine: Negative for cold intolerance, heat intolerance, polydipsia and polyuria.   Genitourinary: Negative for dysuria, flank pain, frequency, hematuria and urgency.   Musculoskeletal: Negative for arthralgias, back pain, joint swelling and myalgias.   Skin: Negative for color change and rash.   Neurological: Negative for dizziness, seizures, syncope, facial asymmetry, speech difficulty, weakness, light-headedness, numbness and headaches.   Hematological: Negative for adenopathy. Does not bruise/bleed easily.   Psychiatric/Behavioral: Negative for agitation, confusion, hallucinations and suicidal ideas.     Objective:     Vital Signs (Most Recent):  Temp: 98.5 °F (36.9 °C) (09/03/18 0020)  Pulse: 65 (09/03/18 0020)  Resp: 18 (09/03/18 0020)  BP: 129/78 (09/03/18 0020)  SpO2: 100 % (09/03/18 0020) Vital Signs (24h Range):  Temp:  [97.7 °F (36.5 °C)-98.5 °F (36.9 °C)] 98.5 °F (36.9 °C)  Pulse:  [] 65  Resp:  [16-18] 18  SpO2:  [98 %-100 %] 100 %  BP: (125-139)/() 129/78     Weight: 87 kg (191 lb 12.8 oz)  Body mass index is 22.74 kg/m².    Physical Exam   Constitutional: He is oriented to person, place, and time. He appears well-developed and well-nourished. No distress.   HENT:   Head: Normocephalic and atraumatic.   Eyes:   Right eye shield in place   Neck: Neck supple. Carotid bruit is not present. No thyromegaly present.   Cardiovascular: Normal rate and regular rhythm. Exam reveals no gallop.   No murmur heard.  Pulmonary/Chest: Effort normal and breath sounds normal. No respiratory distress. He has no wheezes.   Abdominal: Bowel sounds are normal. He exhibits no distension. There is no splenomegaly or hepatomegaly. There is no tenderness.   Musculoskeletal: Normal range of motion. He exhibits no edema.   Neurological: He is alert and  oriented to person, place, and time. No cranial nerve deficit or sensory deficit.   Skin: Skin is warm and dry. No rash noted.   Psychiatric: He has a normal mood and affect. His behavior is normal.           Significant Labs:   CBC:   Recent Labs   Lab  09/02/18   1830   WBC  10.16   HGB  13.6*   HCT  41.1   PLT  202     CMP:   Recent Labs   Lab  09/02/18   1830   NA  141   K  4.1   CL  102   CO2  28   GLU  95   BUN  9   CREATININE  0.95   CALCIUM  9.5   PROT  7.5   ALBUMIN  4.4   BILITOT  0.4   ALKPHOS  58   AST  36   ALT  59*   ANIONGAP  11   EGFRNONAA  >60       Significant Imaging: I have reviewed all pertinent imaging results/findings within the past 24 hours.

## 2018-09-03 NOTE — DISCHARGE INSTRUCTIONS
Post Op Instructions:  Patient should Maintain Eye shield & Dressing until seen tomorrow in eye clinic  Tylenol as needed for general discomfort  Use Prescription for pain medication if pain is severe  Use Prescription for Nausea (Zofran) if nausea or vomiting  No excessive exercise   No Bending, Lifting or Straining  Call MD if significant pain or nausea / vomiting uncontrolled by medications  Call MD if temperature in excess of 101' F  Sleep on stomach or either side.  DO NOT sleep flat on back  Return to eye clinic for Post Op Examination tomorrow Morning.  Bring Medicine bag to tomorrow's appointment.

## 2018-09-03 NOTE — ANESTHESIA PREPROCEDURE EVALUATION
[facility]  Anesthesia Pre-Operative Evaluation         Patient Name: Nirmal Oglesby  YOB: 1995  MRN: [id number]    SUBJECTIVE:     Pre-operative evaluation for Procedure(s) (LRB):  VITRECTOMY, PARS PLANA APPROACH (Right)     09/03/2018    Nirmal Oglesby is a 23 y.o. male w/  significant PMHx GERD, tobacco abuse who presents as a transfer from [facility] for intraocular foreign body. Patient was hammering a hammer and felt something go into his eye. No eye protection was being used at the time. Attempted to irrigate immediately after. Reports blurry vision immediately following the incident that worsened to complete darkness this morning. NPO since 4 PM yesterday.    Patient now presents for the above procedure(s).      LDA:   20 G PIV Rt forearm    Prev airway: None documented.    Drips: None documented.      Patient Active Problem List   Diagnosis    Intraocular foreign body of right eye    Cigarette nicotine dependence without complication    Gastroesophageal reflux disease without esophagitis       Review of patient's allergies indicates:  No Known Allergies    Current Inpatient Medications:   atropine 1%  1 drop Right Eye BID    moxifloxacin  400 mg Intravenous Q24H    pantoprazole  40 mg Oral Daily    senna-docusate 8.6-50 mg  1 tablet Oral BID    sucralfate  1 g Oral QID       No current facility-administered medications on file prior to encounter.      Current Outpatient Medications on File Prior to Encounter   Medication Sig Dispense Refill    ondansetron (ZOFRAN) 4 MG tablet Take 8 mg by mouth 2 (two) times daily.      pantoprazole (PROTONIX) 20 MG tablet Take 20 mg by mouth once daily.      promethazine (PHENERGAN) 25 MG tablet Take 1 tablet (25 mg total) by mouth every 6 (six) hours as needed for Nausea. 60 tablet 0    sucralfate (CARAFATE) 1 gram tablet Take 1 g by mouth 4 (four) times daily.      diazepam (VALIUM) 10 MG Tab Take 1 tablet (10 mg total) by  mouth as needed (take prior to MRI). Take 2 tabs PO prior to MRI, 1 PO extra if needed 3 tablet 0       Past Surgical History:   Procedure Laterality Date    LYMPH NODE BIOPSY      benign    TONSILLECTOMY         Social History     Socioeconomic History    Marital status:      Spouse name: Not on file    Number of children: Not on file    Years of education: Not on file    Highest education level: Not on file   Social Needs    Financial resource strain: Not on file    Food insecurity - worry: Not on file    Food insecurity - inability: Not on file    Transportation needs - medical: Not on file    Transportation needs - non-medical: Not on file   Occupational History    Not on file   Tobacco Use    Smoking status: Current Every Day Smoker     Packs/day: 1.00    Smokeless tobacco: Current User   Substance and Sexual Activity    Alcohol use: No    Drug use: No    Sexual activity: Yes     Partners: Female   Other Topics Concern    Not on file   Social History Narrative    Not on file       OBJECTIVE:     Vital Signs Range (Last 24H):  Temp:  [36.4 °C (97.6 °F)-36.9 °C (98.5 °F)]   Pulse:  []   Resp:  [16-18]   BP: (123-139)/()   SpO2:  [97 %-100 %]       Significant Labs:  Lab Results   Component Value Date    WBC 10.05 09/03/2018    HGB 13.1 (L) 09/03/2018    HCT 39.1 (L) 09/03/2018     09/03/2018    ALT 59 (H) 09/02/2018    AST 36 09/02/2018     09/02/2018    K 4.1 09/02/2018     09/02/2018    CREATININE 0.95 09/02/2018    BUN 9 09/02/2018    CO2 28 09/02/2018    TSH 0.924 07/23/2015    HGBA1C 4.9 09/03/2018       Diagnostic Studies: CT orbit  IMPRESSION:::     3 mm metallic foreign body in the RIGHT globe.         ASSESSMENT/PLAN:     Anesthesia Evaluation    I have reviewed the Patient Summary Reports.    I have reviewed the Nursing Notes.   I have reviewed the Medications.     Review of Systems  Anesthesia Hx:  No problems with previous Anesthesia  History of  prior surgery of interest to airway management or planning: Denies Family Hx of Anesthesia complications.   Denies Personal Hx of Anesthesia complications.   Social:  Smoker, Social Alcohol Use    Hematology/Oncology:        Denies Current/Recent Cancer   EENT/Dental:   denies chronic allergic rhinitis   Cardiovascular:   Exercise tolerance: good Denies Hypertension.  Denies MI.  Denies CAD.    Denies CABG/stent.  Denies Dysrhythmias.             Pulmonary:   Denies COPD.  Denies Asthma.  Denies Recent URI.  Denies Sleep Apnea.    Renal/:   Denies Chronic Renal Disease.     Hepatic/GI:   GERD, well controlled Denies Liver Disease.    Neurological:   Denies TIA. Denies CVA. Denies Seizures.    Endocrine:   Denies Diabetes.    Psych:   Denies Psychiatric History.          Physical Exam  General:  Well nourished    Airway/Jaw/Neck:  Airway Findings: Mouth Opening: Normal Tongue: Normal  General Airway Assessment: Adult  Mallampati: II  Improves to II with phonation.  TM Distance: Normal, at least 6 cm  Jaw/Neck Findings:  Neck ROM: Normal ROM     Eyes/Ears/Nose:Patch over right eye   Dental:  Dental Findings: In tact   Chest/Lungs:  Chest/Lungs Findings: Clear to auscultation, Normal Respiratory Rate     Heart/Vascular:  Heart Findings: Rate: Normal  Rhythm: Regular Rhythm  Sounds: Normal     Abdomen:  Abdomen Findings:  Normal, Soft, Nontender     Musculoskeletal:  Musculoskeletal Findings: Normal   Skin:  Skin Findings: Normal    Mental Status:  Mental Status Findings:  Cooperative, Alert and Oriented         Anesthesia Plan  Type of Anesthesia, risks & benefits discussed:  Anesthesia Type:  general, MAC  Patient's Preference:   Intra-op Monitoring Plan: standard ASA monitors  Intra-op Monitoring Plan Comments:   Post Op Pain Control Plan: multimodal analgesia, IV/PO Opioids PRN and per primary service following discharge from PACU  Post Op Pain Control Plan Comments:   Induction:   IV  Beta Blocker:  Patient is  not currently on a Beta-Blocker (No further documentation required).       Informed Consent: Patient understands risks and agrees with Anesthesia plan.  Questions answered. Anesthesia consent signed with patient.  ASA Score: 2     Day of Surgery Review of History & Physical:    H&P update referred to the surgeon.         Ready For Surgery From Anesthesia Perspective.

## 2018-09-03 NOTE — HPI
Mr. Oglesby is a 24 y/o male who presents to [facility] ED as a transfer from [facility] with a history of Intraocular foreign body as seen on CT from OSH. Pt was hammering a hammer (unknown composition) and felt something go into his eye. No eye protection. Attempted to irrigate immediately after. Reports blurry vision immediately following the incident that worsened to complete darkness this morning.     Last meal at noon, last drank at 4 pm Coke   NKDA  PMHx: HTN diet controlled, brain mass that is being observed (?)  POHx: none, no history of eye crossing or diplopia

## 2018-09-03 NOTE — ASSESSMENT & PLAN NOTE
- 22 yo M presenting with loss of vision OD and eye pain OD x 2 days after working with metal at 9 am on 8/31  - Seen initially at [facility] and CT scan revealed intraocular metallic foreign body. Globes formed  - [facility] LP, IOP 7, minimal pupil reaction with +APD  - Anterior exam shows entry wound at 9 oclock near limbus, 3+ AC rxn, with traumatic cataract  - No view to posterior chamber d/t cataract  - Given LP status, recommend urgent removal of IOFB and patient amenable to surgery  - Informed consent obtained after extensive risks/benefits/alternatives were discussed with the patient including but not limited to pain, bleeding, infection, loss of vision, loss of the eye, asymmetry, need for revision in future, scarring.  Alternatives such as observation were discussed.  All questions were answered.    - Update tetanus  - Start IV moxifloxacin   - Admit to obs  - Eye shield applied  - NPO after midnight  - Atropine BID right eye    To OR in AM

## 2018-09-03 NOTE — SUBJECTIVE & OBJECTIVE
Interval History: Pt. Resting in bed, family at bedside. All questions and concerns addressed.     Review of Systems   Constitutional: Negative for chills and fever.   HENT: Negative for congestion.    Eyes: Positive for visual disturbance. Negative for pain.        R vision loss   Cardiovascular: Negative for chest pain.   Gastrointestinal: Negative for abdominal distention and abdominal pain.   Genitourinary: Negative for difficulty urinating and dysuria.   Musculoskeletal: Negative for arthralgias and back pain.   Allergic/Immunologic: Negative for immunocompromised state.   Neurological: Negative for dizziness, weakness and headaches.   Psychiatric/Behavioral: Negative for agitation.     Objective:     Vital Signs (Most Recent):  Temp: 96.7 °F (35.9 °C) (09/03/18 0753)  Pulse: (!) 48 (09/03/18 0753)  Resp: 14 (09/03/18 0753)  BP: 130/68 (09/03/18 0753)  SpO2: 99 % (09/03/18 0753) Vital Signs (24h Range):  Temp:  [96.7 °F (35.9 °C)-98.5 °F (36.9 °C)] 96.7 °F (35.9 °C)  Pulse:  [] 48  Resp:  [14-18] 14  SpO2:  [97 %-100 %] 99 %  BP: (123-139)/() 130/68     Weight: 87 kg (191 lb 12.8 oz)  Body mass index is 22.74 kg/m².    Intake/Output Summary (Last 24 hours) at 9/3/2018 0811  Last data filed at 9/3/2018 0015  Gross per 24 hour   Intake 250 ml   Output --   Net 250 ml      Physical Exam   Constitutional: He is oriented to person, place, and time. He appears well-developed and well-nourished. No distress.   HENT:   Head: Normocephalic and atraumatic.   Eyes:   Right eye shield in place   Neck: Neck supple. Carotid bruit is not present. No thyromegaly present.   Cardiovascular: Normal rate and regular rhythm. Exam reveals no gallop.   No murmur heard.  Pulmonary/Chest: Effort normal and breath sounds normal. No respiratory distress. He has no wheezes.   Abdominal: Bowel sounds are normal. He exhibits no distension. There is no splenomegaly or hepatomegaly. There is no tenderness.   Musculoskeletal:  Normal range of motion. He exhibits no edema.   Neurological: He is alert and oriented to person, place, and time. No cranial nerve deficit or sensory deficit.   Skin: Skin is warm and dry. No rash noted.   Psychiatric: He has a normal mood and affect. His behavior is normal.   Nursing note and vitals reviewed.      Significant Labs: All pertinent labs within the past 24 hours have been reviewed.    Significant Imaging: I have reviewed all pertinent imaging results/findings within the past 24 hours.

## 2018-09-03 NOTE — HPI
Patient is a 23 year old gentleman with a h/o tobacco abuse.  He was transferred from [facility] secondary to an intraocular foreign body.  Patient states that he was hammering on Friday and felt something go into his right eye.  He attempted to irrigate it afterwards.  Initially, eye was painful and patient noted blurred vision.  This morning, vision in right eye worsened to complete darkness.  He currently complains of mild pain to the eye.  He denies chest pain, SOB, dizziness, palpitations, fever/chills, N/V/D.  Patient does report a history of a brain mass about four years ago, but did not follow-up.

## 2018-09-03 NOTE — ASSESSMENT & PLAN NOTE
Pt reports 1-2 pks/day, not interested in quitting at this time  - Complete cessation recommended.  Nicoderm patch PRN.

## 2018-09-03 NOTE — ED PROVIDER NOTES
Encounter Date: 9/2/2018    SCRIBE #1 NOTE: I, Leesa Camp, am scribing for, and in the presence of,  Dr. Julian. I have scribed the entire note.       History     Chief Complaint   Patient presents with    Blurred Vision     Pt transferred from [facility]. Pt report sblurry vision to right eye. Pt has some metal in his eye     Time patient was seen by the provider: 9:58 PM      The patient is a 23 y.o. male with no pertinent medical history who presents to the ED with a complaint of blurred vision. Patient presented to another hospital for acute vision lost this morning. He only sees black. Two days ago, he felt something fly into his eye when he was using a hammer. He had a CT at the outside hospital that revealed a metallic foreign body within the right globe and he was transferred for ophthalmology.       The history is provided by the patient and medical records.     Review of patient's allergies indicates:  No Known Allergies  History reviewed. No pertinent past medical history.  Past Surgical History:   Procedure Laterality Date    LYMPH NODE BIOPSY      benign    TONSILLECTOMY       Family History   Problem Relation Age of Onset    Cancer Maternal Grandfather         lung cancer, also brain involvement     Social History     Tobacco Use    Smoking status: Current Every Day Smoker     Packs/day: 1.00    Smokeless tobacco: Current User   Substance Use Topics    Alcohol use: No    Drug use: No     Review of Systems   All other systems reviewed and are negative.  I reviewed the ROS from Dr. Flores dated 9/02/2018    Physical Exam     Initial Vitals [09/02/18 2123]   BP Pulse Resp Temp SpO2   (!) 128/102 109 18 98.4 °F (36.9 °C) 98 %      MAP       --         Physical Exam    Nursing note and vitals reviewed.  Constitutional: He appears well-developed and well-nourished. He is not diaphoretic. No distress.   HENT:   Mouth/Throat: Oropharynx is clear and moist.   Eyes:   There is chemosis and injection of  the right eye.  He has no light perception on the right eye.   Neurological: He is oriented to person, place, and time. He has normal strength and normal reflexes. No cranial nerve deficit or sensory deficit.   Skin: Skin is warm and dry. Capillary refill takes less than 2 seconds.         ED Course   Procedures  Labs Reviewed - No data to display       Imaging Results    None          Medical Decision Making:   Initial Assessment:   The patient has a foreign body in his right globe.  I contacted Ophthalmology for emergent evaluation.  ED Management:  9:56 PM - Ophthalmology is evaluating patient.     11:14 PM - I discussed case with Ophthalmology. They will take him to the OR in the morning. I discussed with hospital medicine. He will be placed in observation.   Other:   I have discussed this case with another health care provider.       <> Summary of the Discussion: Ophthalmology            Scribe Attestation:   Scribe #1: I performed the above scribed service and the documentation accurately describes the services I performed. I attest to the accuracy of the note.               Clinical Impression:   The primary encounter diagnosis was Intraocular foreign body of right eye, initial encounter. A diagnosis of Foreign body, eye, right, initial encounter was also pertinent to this visit.            I, Dr. Pablo Julian, personally performed the services described in this documentation. All medical record entries made by the scribe were at my direction and in my presence.  I have reviewed the chart and agree that the record reflects my personal performance and is accurate and complete. Pablo Julian MD.  12:35 AM 09/03/2018                   Mina Julian MD  09/03/18 0035

## 2018-09-03 NOTE — OP NOTE
DATE OF PROCEDURE:  09/03/2018    PREOPERATIVE DIAGNOSIS:  Intraocular metallic foreign body to the right eye with   traumatic cataract.    POSTOPERATIVE DIAGNOSES:  Intraocular metallic foreign body to the right eye   with traumatic cataract with giant retinal tear with detachment and presumed   endophthalmitis to the right eye.    PROCEDURES PERFORMED:  A 23-gauge pars plana vitrectomy with pars plana   lensectomy, repair of corneal laceration, intraocular foreign body removal,   infusion of perfluoron endolaser peripheral iridectomy, air-fluid exchange,   injection of 1000 centistoke silicone oil approximately 4.6 mL and injection of   vancomycin 2.5 mg in 0.05 mL to the right eye.    ENDOLASER PARAMETERS:  Number of spots 1622, power 180 milliwatts, duration 0.1   seconds.    ATTENDING SURGEON:  SHAVONNE Nevarez M.D.    ASSISTANT SURGEON:  Fellow, Allan Caballero.    ANESTHESIA:  LMA.    ESTIMATED BLOOD LOSS:  Minimal.    COMPLICATIONS:  None.    DISPOSITION:  Stable to Recovery.    INDICATIONS FOR SURGERY:  This is a 23-year-old male who three days prior to   surgery was working and felt a sharp pain in his eye while he was attempting to   work with hammer and he was possibly hammering another hammer or metal.  After   the initial pain, the patient did report significant vision change until   yesterday.  The patient was transferred to our ER.  Late last evening, he was   evaluated with CT scan, which showed intraocular metallic foreign body.    Cataract had a minimal view of the posterior segment and the decision was made   to take the patient to surgery to remove the foreign body and repair any other   defects from the trauma.  The patient's vision was light perception and there is   a relatively poor prognosis for visual recovery.  However, attempts were made   to save the eye and get as much vision back as possible.  Risks, benefits, and   alternatives of surgery were discussed in detail.  Risks including  loss of   vision, loss of eye, retinal detachment, infection, hemorrhage, glaucoma,   hypotony, ptosis and diplopia.  The patient voiced understanding and wished to   proceed with the procedure.    DESCRIPTION OF PROCEDURE:  After proper informed consent was obtained, the   patient was brought back to the Operating Suite at [facility] where   LMA was induced.  The patient was prepped and draped in normal sterile fashion   for ophthalmic surgery.  Lid speculum was placed in the right eye.  A temporal   corneal wound was noted.  A partial peritomy was performed temporally.  There   was no significant posterior extension of the wound.  Because it was   self-sealing, the decision was made to put up with vitrectomy and no iris   prolapse or any wound defects were noted.  A standard 3-port 23-gauge pars plana   vitrectomy was set up and infusion cannula inserted inferotemporally 4 mm   posterior to the limbus.  The supranasal and supratemporal trocars were also   placed 4 mm posterior to the limbus.  The infusion cannula was turned on only   after observed to be free and clear of all underlying retinal tissue.  There was   a limited view of the posterior segment as the lens capsule appeared to be   violated from the trauma.  A pars plana lensectomy was performed.  Anterior   capsular shelf was left intact; however, anterior and posterior capsulotomies   were created.  Vitreous was fairly opaque in several locations mixed with blood   and possibly inflammatory versus infectious debris.  Significant inflammation   was presumed to be due to inflammatory debris from the violated lens capsule,   but could not rule out any infectious cause.  A very careful 360-degree cortical   vitrectomy was performed.  There was a giant retinal tear beneath the debris   anteriorly almost 360 degrees from the 1:30 to 2 o'clock position clockwise to   the 10 o'clock position.  There is minimal fluid, but some detachment    anteriorly.  Posterior hilar was manipulated and elevated as best we could   through the edematous and swollen retina posteriorly in the inferotemporal   macula.  IOFB was there.  There is some bruising and mild bleeding beneath that.    A temporal sclerotomy was created by 20-gauge MVR blade and the 20-gauge   reverse action chuck dusted forceps were used to extract foreign body.    Measurements were 1.5 mm x 2 mm and were sent to Pathology for evaluation.  The   temporal sclerotomy was closed with a 7-0 Vicryl suture.  More thorough and   complete vitrectomy was performed to help remove the cortical vitreous from   around the giant retinal tear.  Perfluoron was used to help stabilize the giant   retinal tear and endolaser was applied in a barrier fashion 360 degrees anterior   to the equator, both from the anterior and posterior lips of the giant retinal   tear.  Decision was made to place silicone oil and give the patient aphakic   nature, a peripheral iridectomy was created inferiorly.  An air-fluid exchange   was then performed.  The retina was flat following these maneuvers.  Although,   the whitish appearance to the posterior retina was concerning for a good visual   outcome.  Supranasal trocar was removed and closed with 7-0 Vicryl suture.  A   1000 centistoke oil was infused to the eye to normal pressure via palpation,   approximately 4.6 mL and the supratemporal infusion trocars were removed and   closed with 7-0 Vicryl sutures.  A 2.5 mg of vancomycin in 0.05 mL was also   injected to help provide antibiotics to the posterior segment and the eye was   normal pressure via palpation following this.  A 10-0 nylon suture was placed   through the corneal wound and the conjunctiva from the perineum was also closed   with 7-0 Vicryl suture.  Subconjunctival injections of vancomycin and Decadron   were given to the patient.  The drapes were removed from the patient.  He was   washed free of Betadine prep  solution.  Maxitrol ointment was placed in the   right eye that was patch shielded.  LMA was reversed.  He was brought to the   Recovery Room in stable condition and tolerated the procedure well.  Dr. Nevarez was present for the entire case.      RANULFO/NATHANIEL  dd: 09/03/2018 13:12:17 (CDT)  td: 09/03/2018 13:45:58 (CDT)  Doc ID   #[id number]  Job ID #[id number]    CC:

## 2018-09-03 NOTE — H&P
Pre-Operative History & Physical  Ophthalmology      SUBJECTIVE:     History of Present Illness:  Patient is a 23 y.o. male presents with Intraocular foreign body of right eye, initial encounter [S05.51XA]  Foreign body, eye, right, initial encounter [T15.91XA].    MEDICATIONS:   PTA Medications   Medication Sig    ondansetron (ZOFRAN) 4 MG tablet Take 8 mg by mouth 2 (two) times daily.    pantoprazole (PROTONIX) 20 MG tablet Take 20 mg by mouth once daily.    promethazine (PHENERGAN) 25 MG tablet Take 1 tablet (25 mg total) by mouth every 6 (six) hours as needed for Nausea.    sucralfate (CARAFATE) 1 gram tablet Take 1 g by mouth 4 (four) times daily.    diazepam (VALIUM) 10 MG Tab Take 1 tablet (10 mg total) by mouth as needed (take prior to MRI). Take 2 tabs PO prior to MRI, 1 PO extra if needed       ALLERGIES: Review of patient's allergies indicates:  No Known Allergies    PAST MEDICAL HISTORY: History reviewed. No pertinent past medical history.  PAST SURGICAL HISTORY:   Past Surgical History:   Procedure Laterality Date    LYMPH NODE BIOPSY      benign    TONSILLECTOMY       PAST FAMILY HISTORY:   Family History   Problem Relation Age of Onset    Cancer Maternal Grandfather         lung cancer, also brain involvement     SOCIAL HISTORY:   Social History     Tobacco Use    Smoking status: Current Every Day Smoker     Packs/day: 1.00    Smokeless tobacco: Current User   Substance Use Topics    Alcohol use: No    Drug use: No        MENTAL STATUS: Alert    REVIEW OF SYSTEMS: Negative    OBJECTIVE:     Vital Signs (Most Recent)  Temp: 96.7 °F (35.9 °C) (09/03/18 0753)  Pulse: (!) 48 (09/03/18 0753)  Resp: 14 (09/03/18 0753)  BP: 130/68 (09/03/18 0753)  SpO2: 99 % (09/03/18 0753)    Physical Exam:  General: NAD  HEENT: Atraumatic  Lungs: Adequate respirations, LCTAB  Heart: RRR, No murmur  Abdomen: Soft NT    ASSESSMENT/PLAN:     Patient is a 23 y.o. male with Intraocular foreign body of right eye,  initial encounter [S05.51XA]  Foreign body, eye, right, initial encounter [T15.91XA].     - Plan for surgical correction 23G PPV/PPL/IOFB removal/Ruptured globe revision  60 minutes   GETA   - Risks/benefits/alternatives of the procedure including, but not limited to scarring, bleeding, infection, loss or decreased vision, and/or need for possible repeat surgery discussed with the patient and family.   - Informed consent obtained prior to surgery and the patient/family voiced good understanding.    Mauricio Caballero  9/3/2018  10:02 AM

## 2018-09-03 NOTE — H&P
[facility] Hospital Medicine  History & Physical    Patient Name: Nirmal Oglesby  MRN: [id number]  Admission Date: 9/2/2018  Attending Physician: Mina Julian MD   Primary Care Provider: Octavia Best MD    [facility] Medicine Team: Networked reference to record PCT  Janet Costa PA-C     Patient information was obtained from patient, past medical records and ER records.     Subjective:     Principal Problem:Intraocular foreign body of right eye    Chief Complaint:   Chief Complaint   Patient presents with    Blurred Vision     Pt transferred from [facility]. Pt report sblurry vision to right eye. Pt has some metal in his eye        HPI: Patient is a 23 year old gentleman with a h/o tobacco abuse.  He was transferred from [facility] secondary to an intraocular foreign body.  Patient states that he was hammering on Friday and felt something go into his right eye.  He attempted to irrigate it afterwards.  Initially, eye was painful and patient noted blurred vision.  This morning, vision in right eye worsened to complete darkness.  He currently complains of mild pain to the eye.  He denies chest pain, SOB, dizziness, palpitations, fever/chills, N/V/D.  Patient does report a history of a brain mass about four years ago, but did not follow-up.    History reviewed. No pertinent past medical history.    Past Surgical History:   Procedure Laterality Date    LYMPH NODE BIOPSY      benign    TONSILLECTOMY         Review of patient's allergies indicates:  No Known Allergies    Current Facility-Administered Medications on File Prior to Encounter   Medication    [COMPLETED] fluorescein ophthalmic strip 1 each    [COMPLETED] HYDROmorphone injection 1 mg    [COMPLETED] morphine injection 4 mg    [COMPLETED] omnipaque 350 iohexol 80 mL    [COMPLETED] ondansetron injection 4 mg    [COMPLETED] tetracaine HCl (PF) 0.5 % Drop 1 drop     Current Outpatient Medications on File Prior to  Encounter   Medication Sig    ondansetron (ZOFRAN) 4 MG tablet Take 8 mg by mouth 2 (two) times daily.    pantoprazole (PROTONIX) 20 MG tablet Take 20 mg by mouth once daily.    promethazine (PHENERGAN) 25 MG tablet Take 1 tablet (25 mg total) by mouth every 6 (six) hours as needed for Nausea.    sucralfate (CARAFATE) 1 gram tablet Take 1 g by mouth 4 (four) times daily.    diazepam (VALIUM) 10 MG Tab Take 1 tablet (10 mg total) by mouth as needed (take prior to MRI). Take 2 tabs PO prior to MRI, 1 PO extra if needed     Family History     Problem Relation (Age of Onset)    Cancer Maternal Grandfather        Tobacco Use    Smoking status: Current Every Day Smoker     Packs/day: 1.00    Smokeless tobacco: Current User   Substance and Sexual Activity    Alcohol use: No    Drug use: No    Sexual activity: Yes     Partners: Female     Review of Systems   Constitutional: Negative for activity change, appetite change, chills, diaphoresis, fatigue, fever and unexpected weight change.   HENT: Negative for congestion, rhinorrhea, sore throat, trouble swallowing and voice change.    Eyes: Positive for visual disturbance.   Respiratory: Negative for cough, choking, chest tightness, shortness of breath and wheezing.    Cardiovascular: Negative for chest pain, palpitations and leg swelling.   Gastrointestinal: Negative for abdominal distention, abdominal pain, anal bleeding, blood in stool, constipation, diarrhea, nausea and vomiting.   Endocrine: Negative for cold intolerance, heat intolerance, polydipsia and polyuria.   Genitourinary: Negative for dysuria, flank pain, frequency, hematuria and urgency.   Musculoskeletal: Negative for arthralgias, back pain, joint swelling and myalgias.   Skin: Negative for color change and rash.   Neurological: Negative for dizziness, seizures, syncope, facial asymmetry, speech difficulty, weakness, light-headedness, numbness and headaches.   Hematological: Negative for adenopathy.  Does not bruise/bleed easily.   Psychiatric/Behavioral: Negative for agitation, confusion, hallucinations and suicidal ideas.     Objective:     Vital Signs (Most Recent):  Temp: 98.5 °F (36.9 °C) (09/03/18 0020)  Pulse: 65 (09/03/18 0020)  Resp: 18 (09/03/18 0020)  BP: 129/78 (09/03/18 0020)  SpO2: 100 % (09/03/18 0020) Vital Signs (24h Range):  Temp:  [97.7 °F (36.5 °C)-98.5 °F (36.9 °C)] 98.5 °F (36.9 °C)  Pulse:  [] 65  Resp:  [16-18] 18  SpO2:  [98 %-100 %] 100 %  BP: (125-139)/() 129/78     Weight: 87 kg (191 lb 12.8 oz)  Body mass index is 22.74 kg/m².    Physical Exam   Constitutional: He is oriented to person, place, and time. He appears well-developed and well-nourished. No distress.   HENT:   Head: Normocephalic and atraumatic.   Eyes:   Right eye shield in place   Neck: Neck supple. Carotid bruit is not present. No thyromegaly present.   Cardiovascular: Normal rate and regular rhythm. Exam reveals no gallop.   No murmur heard.  Pulmonary/Chest: Effort normal and breath sounds normal. No respiratory distress. He has no wheezes.   Abdominal: Bowel sounds are normal. He exhibits no distension. There is no splenomegaly or hepatomegaly. There is no tenderness.   Musculoskeletal: Normal range of motion. He exhibits no edema.   Neurological: He is alert and oriented to person, place, and time. No cranial nerve deficit or sensory deficit.   Skin: Skin is warm and dry. No rash noted.   Psychiatric: He has a normal mood and affect. His behavior is normal.           Significant Labs:   CBC:   Recent Labs   Lab  09/02/18   1830   WBC  10.16   HGB  13.6*   HCT  41.1   PLT  202     CMP:   Recent Labs   Lab  09/02/18   1830   NA  141   K  4.1   CL  102   CO2  28   GLU  95   BUN  9   CREATININE  0.95   CALCIUM  9.5   PROT  7.5   ALBUMIN  4.4   BILITOT  0.4   ALKPHOS  58   AST  36   ALT  59*   ANIONGAP  11   EGFRNONAA  >60       Significant Imaging: I have reviewed all pertinent imaging results/findings  within the past 24 hours.    Assessment/Plan:     * Intraocular foreign body of right eye    - CT showed intraocular metallic foreign body.  - Appreciate ophthalmology's assistance.  Plan for surgery in morning.  - NPO, Atropine BID right eye, IV moxifloxacin.  - Patient's tetanus updated in ER.  - Supportive care.          Gastroesophageal reflux disease without esophagitis    - Continue Protonix 20mg daily and sucralfate 1g QID.          Cigarette nicotine dependence without complication    - Complete cessation recommended.  Nicoderm patch PRN.            VTE Risk Mitigation (From admission, onward)    None             Janet Costa PA-C  Department of Hospital Medicine   [facility]

## 2018-09-03 NOTE — NURSING TRANSFER
Nursing Transfer Note      9/3/2018     Transfer To: 511    Transfer via stretcher    Transfer with none    Transported by pct    Medicines sent: none    Chart send with patient: Yes    Notified: friend    Patient reassessed at: 9/3/18 see flowsheets

## 2018-09-03 NOTE — BRIEF OP NOTE
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, Giant retinal tear with detachment OD    Procedure Performed: 23g PPV/PPL/Repair of corneal laceration/IOFB removal/infusion of PFO/EL/peripheral iridectomy/AFx/1000cs Silicone Oil(4.6cc)/injection Vancomycin (2.5mg/0.05mL) OD  EL #1622, P 180mW, D 0.1s    Attending Surgeon: Roque    Assistant Surgeon: Ada    Anesthesia: LMA    Estimated blood loss: Minimal    Complication: None    Specimen: None    Disposition: Stable to recovery    Findings/Outcome: small self sealing K laceration at 9:00 with iris defect beneath, closed with 10-0 nylon, 2.0x1.5mm IOFB removal via temoral pars plana, Catarct removed, anterior capsulotomy made, inferior PI, GRT from 1:30-10:00.  Retina white in posterior pole near IOFB site - possible endophthalmitis OD    Date of Discharge: 9/3/18    Discharge Disposition: stable to recovery then home    F/U: tomorrow

## 2018-09-04 ENCOUNTER — OFFICE VISIT (OUTPATIENT)
Dept: OPHTHALMOLOGY | Facility: CLINIC | Age: 23
End: 2018-09-04
Payer: MEDICAID

## 2018-09-04 ENCOUNTER — TELEPHONE (OUTPATIENT)
Dept: OPHTHALMOLOGY | Facility: CLINIC | Age: 23
End: 2018-09-04

## 2018-09-04 VITALS — SYSTOLIC BLOOD PRESSURE: 139 MMHG | DIASTOLIC BLOOD PRESSURE: 92 MMHG | HEART RATE: 49 BPM

## 2018-09-04 DIAGNOSIS — S05.51XD INTRAOCULAR FOREIGN BODY OF RIGHT EYE, SUBSEQUENT ENCOUNTER: Primary | ICD-10-CM

## 2018-09-04 DIAGNOSIS — H33.031 GIANT RETINAL TEAR, RIGHT: ICD-10-CM

## 2018-09-04 DIAGNOSIS — H27.01 APHAKIA, RIGHT: ICD-10-CM

## 2018-09-04 PROCEDURE — 99213 OFFICE O/P EST LOW 20 MIN: CPT | Mod: PBBFAC | Performed by: OPHTHALMOLOGY

## 2018-09-04 PROCEDURE — 99024 POSTOP FOLLOW-UP VISIT: CPT | Mod: ,,, | Performed by: OPHTHALMOLOGY

## 2018-09-04 PROCEDURE — 99999 PR PBB SHADOW E&M-EST. PATIENT-LVL III: CPT | Mod: PBBFAC,,, | Performed by: OPHTHALMOLOGY

## 2018-09-04 NOTE — TELEPHONE ENCOUNTER
Patient's wife called regarding 's medications. She was not sure how often to take drops. Also she did not have homatropine prescription. Homatropine prescription was sent to [facility] pharmacy in [place], [place]. Patient informed to take all drops QID (Vigamox, Homatropine, PF), PO AVELOX QD, Ointment at night. Patient verbalize her understanding.

## 2018-09-04 NOTE — PROGRESS NOTES
HPI     Post-op Evaluation      Additional comments: 1 day check              Comments     1 day check- He had pain last night. 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

## 2018-09-05 LAB — BACTERIA SPEC AEROBE CULT: NORMAL

## 2018-09-07 LAB — BACTERIA SPEC ANAEROBE CULT: NORMAL

## 2018-09-10 ENCOUNTER — OFFICE VISIT (OUTPATIENT)
Dept: OPHTHALMOLOGY | Facility: CLINIC | Age: 23
End: 2018-09-10
Payer: MEDICAID

## 2018-09-10 DIAGNOSIS — S05.51XD INTRAOCULAR FOREIGN BODY OF RIGHT EYE, SUBSEQUENT ENCOUNTER: Primary | ICD-10-CM

## 2018-09-10 DIAGNOSIS — H33.031 GIANT RETINAL TEAR, RIGHT: ICD-10-CM

## 2018-09-10 PROCEDURE — 99999 PR PBB SHADOW E&M-EST. PATIENT-LVL II: CPT | Mod: PBBFAC,,, | Performed by: OPHTHALMOLOGY

## 2018-09-10 PROCEDURE — 99024 POSTOP FOLLOW-UP VISIT: CPT | Mod: ,,, | Performed by: OPHTHALMOLOGY

## 2018-09-10 PROCEDURE — 99212 OFFICE O/P EST SF 10 MIN: CPT | Mod: PBBFAC,PO | Performed by: OPHTHALMOLOGY

## 2018-09-10 NOTE — PROGRESS NOTES
HPI     Post-op Evaluation      Additional comments: 1 week check              Comments     DLS 9/4/18- He is seeing oil move in eye. Yesterday when he stood up vision OD when black for 25-35 seconds.     PF x 4  HA x 4- He states he did not start this yet. 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

## 2018-10-03 LAB — FUNGUS SPEC CULT: NORMAL

## 2018-10-08 ENCOUNTER — TELEPHONE (OUTPATIENT)
Dept: OPHTHALMOLOGY | Facility: CLINIC | Age: 23
End: 2018-10-08

## 2018-10-08 ENCOUNTER — OFFICE VISIT (OUTPATIENT)
Dept: OPHTHALMOLOGY | Facility: CLINIC | Age: 23
End: 2018-10-08
Payer: MEDICAID

## 2018-10-08 DIAGNOSIS — H33.031 RETINAL DETACHMENT OF RIGHT EYE WITH GIANT RETINAL TEAR: Primary | ICD-10-CM

## 2018-10-08 DIAGNOSIS — H35.21 NONDIABETIC PROLIFERATIVE RETINOPATHY, RIGHT: ICD-10-CM

## 2018-10-08 DIAGNOSIS — H35.21 PROLIFERATIVE VITREORETINOPATHY, RIGHT: ICD-10-CM

## 2018-10-08 DIAGNOSIS — S05.51XD INTRAOCULAR FOREIGN BODY OF RIGHT EYE, SUBSEQUENT ENCOUNTER: Primary | ICD-10-CM

## 2018-10-08 DIAGNOSIS — H33.031 GIANT RETINAL TEAR, RIGHT: ICD-10-CM

## 2018-10-08 PROCEDURE — 99212 OFFICE O/P EST SF 10 MIN: CPT | Mod: PBBFAC,PO | Performed by: OPHTHALMOLOGY

## 2018-10-08 PROCEDURE — 99024 POSTOP FOLLOW-UP VISIT: CPT | Mod: ,,, | Performed by: OPHTHALMOLOGY

## 2018-10-08 PROCEDURE — 99999 PR PBB SHADOW E&M-EST. PATIENT-LVL II: CPT | Mod: PBBFAC,,, | Performed by: OPHTHALMOLOGY

## 2018-10-08 NOTE — PROGRESS NOTES
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  The patient voiced understanding and wished to proceed with the procedure      TO OR

## 2018-10-16 ENCOUNTER — TELEPHONE (OUTPATIENT)
Dept: OPHTHALMOLOGY | Facility: CLINIC | Age: 23
End: 2018-10-16

## 2018-10-17 ENCOUNTER — NURSE TRIAGE (OUTPATIENT)
Dept: ADMINISTRATIVE | Facility: CLINIC | Age: 23
End: 2018-10-17

## 2018-10-17 ENCOUNTER — ANESTHESIA EVENT (OUTPATIENT)
Dept: SURGERY | Facility: HOSPITAL | Age: 23
End: 2018-10-17

## 2018-10-17 ENCOUNTER — ANESTHESIA (OUTPATIENT)
Dept: SURGERY | Facility: HOSPITAL | Age: 23
End: 2018-10-17

## 2018-10-17 ENCOUNTER — TELEPHONE (OUTPATIENT)
Dept: FAMILY MEDICINE | Facility: CLINIC | Age: 23
End: 2018-10-17

## 2018-10-17 ENCOUNTER — OFFICE VISIT (OUTPATIENT)
Dept: FAMILY MEDICINE | Facility: CLINIC | Age: 23
End: 2018-10-17
Payer: MEDICAID

## 2018-10-17 VITALS
HEART RATE: 78 BPM | BODY MASS INDEX: 21.63 KG/M2 | TEMPERATURE: 98 F | WEIGHT: 183.19 LBS | OXYGEN SATURATION: 100 % | DIASTOLIC BLOOD PRESSURE: 79 MMHG | SYSTOLIC BLOOD PRESSURE: 136 MMHG | HEIGHT: 77 IN | RESPIRATION RATE: 16 BRPM

## 2018-10-17 DIAGNOSIS — R05.3 PERSISTENT COUGH: ICD-10-CM

## 2018-10-17 DIAGNOSIS — R06.2 WHEEZING: ICD-10-CM

## 2018-10-17 DIAGNOSIS — J20.2 ACUTE BRONCHITIS DUE TO STREPTOCOCCUS: Primary | ICD-10-CM

## 2018-10-17 PROBLEM — H33.21 RETINAL DETACHMENT, RIGHT: Status: ACTIVE | Noted: 2018-10-17

## 2018-10-17 PROCEDURE — 99214 OFFICE O/P EST MOD 30 MIN: CPT | Mod: S$PBB,,, | Performed by: FAMILY MEDICINE

## 2018-10-17 PROCEDURE — 99213 OFFICE O/P EST LOW 20 MIN: CPT | Mod: PBBFAC,PO | Performed by: FAMILY MEDICINE

## 2018-10-17 PROCEDURE — 99999 PR PBB SHADOW E&M-EST. PATIENT-LVL III: CPT | Mod: PBBFAC,,, | Performed by: FAMILY MEDICINE

## 2018-10-17 RX ORDER — ALBUTEROL SULFATE 90 UG/1
2 AEROSOL, METERED RESPIRATORY (INHALATION) 4 TIMES DAILY
Qty: 1 INHALER | Refills: 1 | Status: SHIPPED | OUTPATIENT
Start: 2018-10-17 | End: 2019-01-14

## 2018-10-17 RX ORDER — HYDROCODONE POLISTIREX AND CHLORPHENIRAMINE POLISTIREX 10; 8 MG/5ML; MG/5ML
5 SUSPENSION, EXTENDED RELEASE ORAL EVERY 12 HOURS PRN
Qty: 115 ML | Refills: 0 | Status: SHIPPED | OUTPATIENT
Start: 2018-10-17 | End: 2018-10-18 | Stop reason: SDUPTHER

## 2018-10-17 RX ORDER — DOXYCYCLINE 100 MG/1
100 CAPSULE ORAL 2 TIMES DAILY
Qty: 20 CAPSULE | Refills: 0 | Status: SHIPPED | OUTPATIENT
Start: 2018-10-17 | End: 2018-11-19 | Stop reason: HOSPADM

## 2018-10-17 NOTE — TELEPHONE ENCOUNTER
----- Message from Kay Martin sent at 10/17/2018 12:09 PM CDT -----  Contact: Patient  Type:  Sooner Apoointment Request    Caller is requesting a sooner appointment.  Caller declined first available appointment listed below.  Caller will not accept being placed on the waitlist and is requesting a message be sent to doctor.    Name of Caller:  Patient  When is the first available appointment?  10/26  Symptoms:  Bronchitis, cough/congestion  Best Call Back Number:  [phone] (home)

## 2018-10-17 NOTE — ANESTHESIA PREPROCEDURE EVALUATION
10/17/2018  Nirmal Oglesby is a 23 y.o., male     Pre-operative evaluation for Procedure(s) (LRB):  SCLERAL BUCKLING (Right)    Nirmal Oglesby is a 23 y.o. male     LDA:     Prev airway:     Drips:     Patient Active Problem List   Diagnosis    Intraocular foreign body of right eye    Cigarette nicotine dependence without complication    Gastroesophageal reflux disease without esophagitis    Giant retinal tear, right    Aphakia, right    Proliferative vitreoretinopathy, right    Retinal detachment, right       Review of patient's allergies indicates:  No Known Allergies     No current facility-administered medications on file prior to encounter.      Current Outpatient Medications on File Prior to Encounter   Medication Sig Dispense Refill    diazepam (VALIUM) 10 MG Tab Take 1 tablet (10 mg total) by mouth as needed (take prior to MRI). Take 2 tabs PO prior to MRI, 1 PO extra if needed 3 tablet 0    homatropine (ISOPTO HOMATROPINE) 5 % ophthalmic solution Place 1 drop into the right eye 4 (four) times daily. 5 mL 1       Past Surgical History:   Procedure Laterality Date    LYMPH NODE BIOPSY      benign    TONSILLECTOMY      VITRECTOMY BY PARS PLANA APPROACH Right 9/3/2018    Procedure: VITRECTOMY, PARS PLANA APPROACH;  Surgeon: SHAVONNE Nevarez MD;  Location: [facility] OR [address] [place] [address];  Service: Ophthalmology;  Laterality: Right;  silicone oil injection, fluid air exchange, endolaser, peripheral iridotomy, ruptured globe revision for ruptured globe for foreign body, giant retinal tear, retinal detachment, endophthalmitis, traumatic cataract    VITRECTOMY, PARS PLANA APPROACH Right 9/3/2018    Performed by SHAVONNE Nevarez MD at [facility] OR [address] [place] [address]       Social History     Socioeconomic History    Marital status:      Spouse name: Not on file    Number of children: Not on file    Years  of education: Not on file    Highest education level: Not on file   Social Needs    Financial resource strain: Not on file    Food insecurity - worry: Not on file    Food insecurity - inability: Not on file    Transportation needs - medical: Not on file    Transportation needs - non-medical: Not on file   Occupational History    Not on file   Tobacco Use    Smoking status: Current Every Day Smoker     Packs/day: 1.00    Smokeless tobacco: Current User   Substance and Sexual Activity    Alcohol use: No    Drug use: No    Sexual activity: Yes     Partners: Female   Other Topics Concern    Not on file   Social History Narrative    Not on file         Vital Signs Range (Last 24H):         CBC: No results for input(s): WBC, RBC, HGB, HCT, PLT, MCV, MCH, MCHC in the last 72 hours.    CMP: No results for input(s): NA, K, CL, CO2, BUN, CREATININE, GLU, MG, PHOS, CALCIUM, ALBUMIN, PROT, ALKPHOS, ALT, AST, BILITOT in the last 72 hours.    INR  No results for input(s): PT, INR, PROTIME, APTT in the last 72 hours.        Diagnostic Studies:      EKD Echo:      .    Anesthesia Evaluation         Review of Systems      Physical Exam  General:  Well nourished    Airway/Jaw/Neck:  Airway Findings: Mouth Opening: Normal Tongue: Normal  General Airway Assessment: Adult  Mallampati: II  Improves to II with phonation.  TM Distance: Normal, at least 6 cm            Mental Status:  Mental Status Findings:  Cooperative, Alert and Oriented         Anesthesia Plan  Type of Anesthesia, risks & benefits discussed:  Anesthesia Type:  general  Patient's Preference:   Intra-op Monitoring Plan: standard ASA monitors  Intra-op Monitoring Plan Comments:   Post Op Pain Control Plan: multimodal analgesia  Post Op Pain Control Plan Comments:   Induction:   IV  Beta Blocker:  Patient is not currently on a Beta-Blocker (No further documentation required).       Informed Consent: Patient understands risks and agrees with Anesthesia  plan.  Questions answered. Anesthesia consent signed with patient.  ASA Score: 2     Day of Surgery Review of History & Physical:    H&P update referred to the surgeon.     Anesthesia Plan Notes: Discussed with surgeon and patient, procedure cancelled due to significant URI with bilateral wheezes and uncontrollable productive cough. Patient afebrile with stable vital signs and ok to discharge with instruction to followup with primary doctor to treat URI        Ready For Surgery From Anesthesia Perspective.

## 2018-10-17 NOTE — TELEPHONE ENCOUNTER
Spoke to patient appointment scheduled for cough and congestion for same day.patient confirmed appointment

## 2018-10-17 NOTE — PROGRESS NOTES
Subjective:       Patient ID: Nirmal Oglesby is a 23 y.o. male.    Chief Complaint: No chief complaint on file.    New to me patient here for UC visit.  Referred by Ophtho - had to cancel an eye surgery today due to persistent cough and wheezing.      Cough   This is a new problem. The current episode started in the past 7 days. The problem has been waxing and waning. The problem occurs every few minutes. The cough is non-productive (can be Non-productive or productive of sputum.). Associated symptoms include chest pain, headaches, postnasal drip, rhinorrhea, shortness of breath and wheezing. Pertinent negatives include no fever, hemoptysis or rash. Reactive Airway Disease - in the Fall season     Review of Systems   Constitutional: Positive for activity change. Negative for fever and unexpected weight change.   HENT: Positive for postnasal drip and rhinorrhea. Negative for hearing loss and trouble swallowing.    Eyes: Positive for discharge. Negative for visual disturbance.   Respiratory: Positive for cough, shortness of breath and wheezing. Negative for hemoptysis and chest tightness.    Cardiovascular: Positive for chest pain and palpitations.   Gastrointestinal: Negative for abdominal pain, blood in stool, constipation, diarrhea, nausea and vomiting.   Endocrine: Positive for polydipsia and polyuria.   Genitourinary: Positive for difficulty urinating and urgency. Negative for hematuria.   Musculoskeletal: Negative for arthralgias, joint swelling and neck pain.   Skin: Negative for rash.   Neurological: Positive for weakness and headaches. Negative for numbness.   Psychiatric/Behavioral: Positive for confusion and dysphoric mood.   All other systems reviewed and are negative.      Objective:      Physical Exam   Constitutional: He appears well-developed. No distress.   HENT:   Right Ear: Tympanic membrane normal.   Left Ear: Tympanic membrane normal.   Nose: Mucosal edema present.   Mouth/Throat: Posterior  oropharyngeal erythema present.   Neck: Neck supple.   Cardiovascular: Normal rate and regular rhythm.   No murmur heard.  Pulmonary/Chest: Effort normal. He has wheezes. He has rales (trace amount) in the right upper field.   Lymphadenopathy:     He has no cervical adenopathy.   Skin: Skin is warm and dry.       Assessment:       1. Acute bronchitis due to Streptococcus    2. Wheezing    3. Persistent cough        Plan:       Diagnoses and all orders for this visit:    Acute bronchitis due to Streptococcus    Wheezing    Persistent cough    Other orders  -     doxycycline (MONODOX) 100 MG capsule; Take 1 capsule (100 mg total) by mouth 2 (two) times daily.  -     albuterol (PROVENTIL/VENTOLIN HFA) 90 mcg/actuation inhaler; Inhale 2 puffs into the lungs 4 (four) times daily.  -     hydrocodone-chlorpheniramine (TUSSIONEX) 10-8 mg/5 mL suspension; Take 5 mLs by mouth every 12 (twelve) hours as needed.      Push fluids intake.

## 2018-10-18 ENCOUNTER — TELEPHONE (OUTPATIENT)
Dept: FAMILY MEDICINE | Facility: CLINIC | Age: 23
End: 2018-10-18

## 2018-10-18 RX ORDER — HYDROCODONE POLISTIREX AND CHLORPHENIRAMINE POLISTIREX 10; 8 MG/5ML; MG/5ML
5 SUSPENSION, EXTENDED RELEASE ORAL EVERY 12 HOURS PRN
Qty: 115 ML | Refills: 0 | Status: SHIPPED | OUTPATIENT
Start: 2018-10-18 | End: 2018-10-28

## 2018-10-18 NOTE — TELEPHONE ENCOUNTER
Pt states [facility] was out of the medication and would like to have [facility].  Please Advise

## 2018-10-18 NOTE — TELEPHONE ENCOUNTER
Called [facility] pharmacy to cancel prescription.  Prescription has already been cancelled.

## 2018-10-18 NOTE — TELEPHONE ENCOUNTER
Reason for Disposition   Requesting regular office appointment    Protocols used: ST INFORMATION ONLY CALL-A-AH

## 2018-10-18 NOTE — TELEPHONE ENCOUNTER
----- Message from RT Genny sent at 10/18/2018 10:22 AM CDT -----  Contact: Kathryn,[phone], [facility]  Kathryn,[phone], [facility], requesting to inform they are out of the cough syrup, and since it is a controlled can you please send the prescription to [facility], [place], [place], (phone no. / Fax no. [phone]), thanks.

## 2018-10-18 NOTE — TELEPHONE ENCOUNTER
----- Message from Chen Mao sent at 10/18/2018 12:07 PM CDT -----  Contact: Pt  Pt called to speak to the nurse to have the location changed for his medication due to the pharmacy not having the medication and would like a call back today asap.    Pt can be reached at [phone].    Thanks

## 2018-10-22 ENCOUNTER — TELEPHONE (OUTPATIENT)
Dept: OPHTHALMOLOGY | Facility: CLINIC | Age: 23
End: 2018-10-22

## 2018-10-22 DIAGNOSIS — H33.031 RETINAL DETACHMENT OF RIGHT EYE WITH GIANT RETINAL TEAR: Primary | ICD-10-CM

## 2018-10-30 ENCOUNTER — TELEPHONE (OUTPATIENT)
Dept: OPHTHALMOLOGY | Facility: CLINIC | Age: 23
End: 2018-10-30

## 2018-10-30 NOTE — TELEPHONE ENCOUNTER
Called patient and informed him of change in arrival time. He needs to arrive at 8:30 instead of 7

## 2018-10-31 ENCOUNTER — ANESTHESIA EVENT (OUTPATIENT)
Dept: SURGERY | Facility: HOSPITAL | Age: 23
End: 2018-10-31
Payer: MEDICAID

## 2018-10-31 ENCOUNTER — HOSPITAL ENCOUNTER (OUTPATIENT)
Facility: HOSPITAL | Age: 23
Discharge: HOME | End: 2018-10-31
Attending: OPHTHALMOLOGY | Admitting: OPHTHALMOLOGY
Payer: MEDICAID

## 2018-10-31 ENCOUNTER — ANESTHESIA (OUTPATIENT)
Dept: SURGERY | Facility: HOSPITAL | Age: 23
End: 2018-10-31
Payer: MEDICAID

## 2018-10-31 VITALS
HEIGHT: 77 IN | SYSTOLIC BLOOD PRESSURE: 117 MMHG | DIASTOLIC BLOOD PRESSURE: 60 MMHG | WEIGHT: 190 LBS | OXYGEN SATURATION: 100 % | RESPIRATION RATE: 16 BRPM | HEART RATE: 52 BPM | TEMPERATURE: 99 F | BODY MASS INDEX: 22.43 KG/M2

## 2018-10-31 DIAGNOSIS — H33.21 RETINAL DETACHMENT, RIGHT: ICD-10-CM

## 2018-10-31 DIAGNOSIS — H35.21 PROLIFERATIVE VITREORETINOPATHY, RIGHT: Primary | ICD-10-CM

## 2018-10-31 PROCEDURE — 27201423 OPTIME MED/SURG SUP & DEVICES STERILE SUPPLY: Performed by: OPHTHALMOLOGY

## 2018-10-31 PROCEDURE — D9220A PRA ANESTHESIA: Mod: ANES,,, | Performed by: ANESTHESIOLOGY

## 2018-10-31 PROCEDURE — 36000708 HC OR TIME LEV III 1ST 15 MIN: Performed by: OPHTHALMOLOGY

## 2018-10-31 PROCEDURE — 36000709 HC OR TIME LEV III EA ADD 15 MIN: Performed by: OPHTHALMOLOGY

## 2018-10-31 PROCEDURE — 63600175 PHARM REV CODE 636 W HCPCS: Performed by: NURSE ANESTHETIST, CERTIFIED REGISTERED

## 2018-10-31 PROCEDURE — 00145 ANES PX EYE VITREORTNL SURG: CPT | Performed by: OPHTHALMOLOGY

## 2018-10-31 PROCEDURE — C1784 OCULAR DEV, INTRAOP, DET RET: HCPCS | Performed by: OPHTHALMOLOGY

## 2018-10-31 PROCEDURE — 27800903 OPTIME MED/SURG SUP & DEVICES OTHER IMPLANTS: Performed by: OPHTHALMOLOGY

## 2018-10-31 PROCEDURE — 37000009 HC ANESTHESIA EA ADD 15 MINS: Performed by: OPHTHALMOLOGY

## 2018-10-31 PROCEDURE — 71000044 HC DOSC ROUTINE RECOVERY FIRST HOUR: Performed by: OPHTHALMOLOGY

## 2018-10-31 PROCEDURE — 25000003 PHARM REV CODE 250: Performed by: OPHTHALMOLOGY

## 2018-10-31 PROCEDURE — 25000003 PHARM REV CODE 250: Performed by: NURSE ANESTHETIST, CERTIFIED REGISTERED

## 2018-10-31 PROCEDURE — 71000015 HC POSTOP RECOV 1ST HR: Performed by: OPHTHALMOLOGY

## 2018-10-31 PROCEDURE — S0020 INJECTION, BUPIVICAINE HYDRO: HCPCS | Performed by: OPHTHALMOLOGY

## 2018-10-31 PROCEDURE — 63600175 PHARM REV CODE 636 W HCPCS: Performed by: OPHTHALMOLOGY

## 2018-10-31 PROCEDURE — 37000008 HC ANESTHESIA 1ST 15 MINUTES: Performed by: OPHTHALMOLOGY

## 2018-10-31 PROCEDURE — D9220A PRA ANESTHESIA: Mod: CRNA,,, | Performed by: NURSE ANESTHETIST, CERTIFIED REGISTERED

## 2018-10-31 PROCEDURE — 67113 REPAIR RETINAL DETACH CPLX: CPT | Mod: 78,RT,, | Performed by: OPHTHALMOLOGY

## 2018-10-31 PROCEDURE — 63600175 PHARM REV CODE 636 W HCPCS: Performed by: ANESTHESIOLOGY

## 2018-10-31 PROCEDURE — 71000045 HC DOSC ROUTINE RECOVERY EA ADD'L HR: Performed by: OPHTHALMOLOGY

## 2018-10-31 DEVICE — IMPLANTABLE DEVICE: Type: IMPLANTABLE DEVICE | Site: EYE | Status: FUNCTIONAL

## 2018-10-31 RX ORDER — ACETAMINOPHEN 325 MG/1
650 TABLET ORAL EVERY 4 HOURS PRN
Status: DISCONTINUED | OUTPATIENT
Start: 2018-10-31 | End: 2018-10-31 | Stop reason: HOSPADM

## 2018-10-31 RX ORDER — MEPERIDINE HYDROCHLORIDE 25 MG/ML
12.5 INJECTION INTRAMUSCULAR; INTRAVENOUS; SUBCUTANEOUS ONCE AS NEEDED
Status: DISCONTINUED | OUTPATIENT
Start: 2018-10-31 | End: 2018-10-31 | Stop reason: HOSPADM

## 2018-10-31 RX ORDER — DEXAMETHASONE SODIUM PHOSPHATE 4 MG/ML
INJECTION, SOLUTION INTRA-ARTICULAR; INTRALESIONAL; INTRAMUSCULAR; INTRAVENOUS; SOFT TISSUE
Status: DISCONTINUED
Start: 2018-10-31 | End: 2018-10-31 | Stop reason: HOSPADM

## 2018-10-31 RX ORDER — GLYCOPYRROLATE 0.2 MG/ML
INJECTION INTRAMUSCULAR; INTRAVENOUS
Status: DISCONTINUED | OUTPATIENT
Start: 2018-10-31 | End: 2018-10-31

## 2018-10-31 RX ORDER — ONDANSETRON 2 MG/ML
INJECTION INTRAMUSCULAR; INTRAVENOUS
Status: DISCONTINUED | OUTPATIENT
Start: 2018-10-31 | End: 2018-10-31

## 2018-10-31 RX ORDER — PHENYLEPHRINE HYDROCHLORIDE 25 MG/ML
1 SOLUTION/ DROPS OPHTHALMIC
Status: DISPENSED | OUTPATIENT
Start: 2018-10-31

## 2018-10-31 RX ORDER — ACETAZOLAMIDE 500 MG/5ML
INJECTION, POWDER, LYOPHILIZED, FOR SOLUTION INTRAVENOUS
Status: DISCONTINUED | OUTPATIENT
Start: 2018-10-31 | End: 2018-10-31

## 2018-10-31 RX ORDER — VANCOMYCIN HYDROCHLORIDE 500 MG/10ML
INJECTION, POWDER, LYOPHILIZED, FOR SOLUTION INTRAVENOUS
Status: DISCONTINUED | OUTPATIENT
Start: 2018-10-31 | End: 2018-10-31 | Stop reason: HOSPADM

## 2018-10-31 RX ORDER — CYCLOPENTOLATE HYDROCHLORIDE 10 MG/ML
1 SOLUTION/ DROPS OPHTHALMIC
Status: DISPENSED | OUTPATIENT
Start: 2018-10-31

## 2018-10-31 RX ORDER — CEPHALEXIN 500 MG/1
500 TABLET ORAL 2 TIMES DAILY
Qty: 20 TABLET | Refills: 0 | Status: SHIPPED | OUTPATIENT
Start: 2018-10-31 | End: 2018-11-10

## 2018-10-31 RX ORDER — DEXAMETHASONE SODIUM PHOSPHATE 4 MG/ML
INJECTION, SOLUTION INTRA-ARTICULAR; INTRALESIONAL; INTRAMUSCULAR; INTRAVENOUS; SOFT TISSUE
Status: DISCONTINUED | OUTPATIENT
Start: 2018-10-31 | End: 2018-10-31 | Stop reason: HOSPADM

## 2018-10-31 RX ORDER — ROCURONIUM BROMIDE 10 MG/ML
INJECTION, SOLUTION INTRAVENOUS
Status: DISCONTINUED | OUTPATIENT
Start: 2018-10-31 | End: 2018-10-31

## 2018-10-31 RX ORDER — HYDROCODONE BITARTRATE AND ACETAMINOPHEN 5; 325 MG/1; MG/1
1 TABLET ORAL EVERY 4 HOURS PRN
Status: DISCONTINUED | OUTPATIENT
Start: 2018-10-31 | End: 2018-10-31 | Stop reason: HOSPADM

## 2018-10-31 RX ORDER — MIDAZOLAM HYDROCHLORIDE 1 MG/ML
INJECTION, SOLUTION INTRAMUSCULAR; INTRAVENOUS
Status: DISCONTINUED | OUTPATIENT
Start: 2018-10-31 | End: 2018-10-31

## 2018-10-31 RX ORDER — ACETAMINOPHEN 10 MG/ML
INJECTION, SOLUTION INTRAVENOUS
Status: DISCONTINUED | OUTPATIENT
Start: 2018-10-31 | End: 2018-10-31

## 2018-10-31 RX ORDER — MOXIFLOXACIN 5 MG/ML
1 SOLUTION/ DROPS OPHTHALMIC
Status: DISPENSED | OUTPATIENT
Start: 2018-10-31

## 2018-10-31 RX ORDER — DEXAMETHASONE SODIUM PHOSPHATE 4 MG/ML
INJECTION, SOLUTION INTRA-ARTICULAR; INTRALESIONAL; INTRAMUSCULAR; INTRAVENOUS; SOFT TISSUE
Status: DISCONTINUED | OUTPATIENT
Start: 2018-10-31 | End: 2018-10-31

## 2018-10-31 RX ORDER — OXYCODONE AND ACETAMINOPHEN 5; 325 MG/1; MG/1
1 TABLET ORAL EVERY 6 HOURS PRN
Qty: 30 TABLET | Refills: 0 | Status: SHIPPED | OUTPATIENT
Start: 2018-10-31 | End: 2018-11-19 | Stop reason: HOSPADM

## 2018-10-31 RX ORDER — SODIUM CHLORIDE 9 MG/ML
INJECTION, SOLUTION INTRAVENOUS CONTINUOUS
Status: DISCONTINUED | OUTPATIENT
Start: 2018-10-31 | End: 2018-10-31 | Stop reason: HOSPADM

## 2018-10-31 RX ORDER — NEOMYCIN SULFATE, POLYMYXIN B SULFATE, AND DEXAMETHASONE 3.5; 10000; 1 MG/G; [USP'U]/G; MG/G
OINTMENT OPHTHALMIC
Status: DISCONTINUED | OUTPATIENT
Start: 2018-10-31 | End: 2018-10-31 | Stop reason: HOSPADM

## 2018-10-31 RX ORDER — ONDANSETRON 8 MG/1
8 TABLET, ORALLY DISINTEGRATING ORAL EVERY 8 HOURS PRN
Status: DISCONTINUED | OUTPATIENT
Start: 2018-10-31 | End: 2018-10-31 | Stop reason: HOSPADM

## 2018-10-31 RX ORDER — PREDNISOLONE ACETATE 10 MG/ML
1 SUSPENSION/ DROPS OPHTHALMIC
Status: DISPENSED | OUTPATIENT
Start: 2018-10-31

## 2018-10-31 RX ORDER — VANCOMYCIN HYDROCHLORIDE 500 MG/10ML
INJECTION, POWDER, LYOPHILIZED, FOR SOLUTION INTRAVENOUS
Status: DISCONTINUED
Start: 2018-10-31 | End: 2018-10-31 | Stop reason: HOSPADM

## 2018-10-31 RX ORDER — PROPOFOL 10 MG/ML
VIAL (ML) INTRAVENOUS
Status: DISCONTINUED | OUTPATIENT
Start: 2018-10-31 | End: 2018-10-31

## 2018-10-31 RX ORDER — LORAZEPAM 2 MG/ML
0.25 INJECTION INTRAMUSCULAR ONCE AS NEEDED
Status: DISCONTINUED | OUTPATIENT
Start: 2018-10-31 | End: 2018-10-31 | Stop reason: HOSPADM

## 2018-10-31 RX ORDER — KETOROLAC TROMETHAMINE 30 MG/ML
INJECTION, SOLUTION INTRAMUSCULAR; INTRAVENOUS
Status: DISCONTINUED | OUTPATIENT
Start: 2018-10-31 | End: 2018-10-31

## 2018-10-31 RX ORDER — LIDOCAINE HYDROCHLORIDE 10 MG/ML
1 INJECTION, SOLUTION EPIDURAL; INFILTRATION; INTRACAUDAL; PERINEURAL ONCE
Status: DISCONTINUED | OUTPATIENT
Start: 2018-10-31 | End: 2018-10-31 | Stop reason: HOSPADM

## 2018-10-31 RX ORDER — BUPIVACAINE HYDROCHLORIDE 7.5 MG/ML
INJECTION, SOLUTION EPIDURAL; RETROBULBAR
Status: DISCONTINUED | OUTPATIENT
Start: 2018-10-31 | End: 2018-10-31 | Stop reason: HOSPADM

## 2018-10-31 RX ORDER — ACETAZOLAMIDE 500 MG/5ML
INJECTION, POWDER, LYOPHILIZED, FOR SOLUTION INTRAVENOUS
Status: COMPLETED
Start: 2018-10-31 | End: 2018-10-31

## 2018-10-31 RX ORDER — LIDOCAINE HCL/PF 100 MG/5ML
SYRINGE (ML) INTRAVENOUS
Status: DISCONTINUED | OUTPATIENT
Start: 2018-10-31 | End: 2018-10-31

## 2018-10-31 RX ORDER — ONDANSETRON 4 MG/1
4 TABLET, FILM COATED ORAL EVERY 8 HOURS PRN
Qty: 20 TABLET | Refills: 0 | Status: SHIPPED | OUTPATIENT
Start: 2018-10-31 | End: 2019-01-14

## 2018-10-31 RX ORDER — EPINEPHRINE 1 MG/ML
INJECTION, SOLUTION INTRACARDIAC; INTRAMUSCULAR; INTRAVENOUS; SUBCUTANEOUS
Status: DISCONTINUED
Start: 2018-10-31 | End: 2018-10-31 | Stop reason: HOSPADM

## 2018-10-31 RX ORDER — SODIUM CHLORIDE 0.9 % (FLUSH) 0.9 %
3 SYRINGE (ML) INJECTION
Status: DISCONTINUED | OUTPATIENT
Start: 2018-10-31 | End: 2018-10-31 | Stop reason: HOSPADM

## 2018-10-31 RX ORDER — LIDOCAINE HYDROCHLORIDE 20 MG/ML
INJECTION, SOLUTION EPIDURAL; INFILTRATION; INTRACAUDAL; PERINEURAL
Status: DISCONTINUED
Start: 2018-10-31 | End: 2018-10-31 | Stop reason: HOSPADM

## 2018-10-31 RX ORDER — TETRACAINE HYDROCHLORIDE 5 MG/ML
1 SOLUTION OPHTHALMIC
Status: DISPENSED | OUTPATIENT
Start: 2018-10-31

## 2018-10-31 RX ORDER — FENTANYL CITRATE 50 UG/ML
INJECTION, SOLUTION INTRAMUSCULAR; INTRAVENOUS
Status: DISCONTINUED | OUTPATIENT
Start: 2018-10-31 | End: 2018-10-31

## 2018-10-31 RX ORDER — LIDOCAINE HYDROCHLORIDE 20 MG/ML
INJECTION, SOLUTION EPIDURAL; INFILTRATION; INTRACAUDAL; PERINEURAL
Status: DISCONTINUED | OUTPATIENT
Start: 2018-10-31 | End: 2018-10-31 | Stop reason: HOSPADM

## 2018-10-31 RX ORDER — HYDROMORPHONE HYDROCHLORIDE 1 MG/ML
0.2 INJECTION, SOLUTION INTRAMUSCULAR; INTRAVENOUS; SUBCUTANEOUS EVERY 5 MIN PRN
Status: DISCONTINUED | OUTPATIENT
Start: 2018-10-31 | End: 2018-10-31 | Stop reason: HOSPADM

## 2018-10-31 RX ORDER — BUPIVACAINE HYDROCHLORIDE 7.5 MG/ML
INJECTION, SOLUTION EPIDURAL; RETROBULBAR
Status: DISCONTINUED
Start: 2018-10-31 | End: 2018-10-31 | Stop reason: HOSPADM

## 2018-10-31 RX ORDER — NEOSTIGMINE METHYLSULFATE 1 MG/ML
INJECTION, SOLUTION INTRAVENOUS
Status: DISCONTINUED | OUTPATIENT
Start: 2018-10-31 | End: 2018-10-31

## 2018-10-31 RX ORDER — NEOMYCIN SULFATE, POLYMYXIN B SULFATE, AND DEXAMETHASONE 3.5; 10000; 1 MG/G; [USP'U]/G; MG/G
OINTMENT OPHTHALMIC
Status: DISCONTINUED
Start: 2018-10-31 | End: 2018-10-31 | Stop reason: HOSPADM

## 2018-10-31 RX ORDER — HYDROMORPHONE HYDROCHLORIDE 1 MG/ML
0.2 INJECTION, SOLUTION INTRAMUSCULAR; INTRAVENOUS; SUBCUTANEOUS EVERY 5 MIN PRN
Status: DISCONTINUED | OUTPATIENT
Start: 2018-10-31 | End: 2018-10-31 | Stop reason: SDUPTHER

## 2018-10-31 RX ORDER — TROPICAMIDE 10 MG/ML
1 SOLUTION/ DROPS OPHTHALMIC
Status: DISPENSED | OUTPATIENT
Start: 2018-10-31

## 2018-10-31 RX ORDER — EPINEPHRINE 1 MG/ML
INJECTION INTRAMUSCULAR; INTRAVENOUS; SUBCUTANEOUS
Status: DISCONTINUED | OUTPATIENT
Start: 2018-10-31 | End: 2018-10-31 | Stop reason: HOSPADM

## 2018-10-31 RX ADMIN — ACETAMINOPHEN 1000 MG: 10 INJECTION, SOLUTION INTRAVENOUS at 12:10

## 2018-10-31 RX ADMIN — KETOROLAC TROMETHAMINE 30 MG: 30 INJECTION, SOLUTION INTRAMUSCULAR; INTRAVENOUS at 12:10

## 2018-10-31 RX ADMIN — ACETAZOLAMIDE 500 MG: 500 INJECTION, POWDER, LYOPHILIZED, FOR SOLUTION INTRAVENOUS at 01:10

## 2018-10-31 RX ADMIN — TETRACAINE HYDROCHLORIDE 1 DROP: 5 SOLUTION OPHTHALMIC at 09:10

## 2018-10-31 RX ADMIN — FENTANYL CITRATE 50 MCG: 50 INJECTION, SOLUTION INTRAMUSCULAR; INTRAVENOUS at 11:10

## 2018-10-31 RX ADMIN — MOXIFLOXACIN 1 DROP: 5 SOLUTION/ DROPS OPHTHALMIC at 09:10

## 2018-10-31 RX ADMIN — MIDAZOLAM HYDROCHLORIDE 2 MG: 1 INJECTION, SOLUTION INTRAMUSCULAR; INTRAVENOUS at 11:10

## 2018-10-31 RX ADMIN — TROPICAMIDE 1 DROP: 10 SOLUTION/ DROPS OPHTHALMIC at 10:10

## 2018-10-31 RX ADMIN — GLYCOPYRROLATE 0.2 MG: 0.2 INJECTION, SOLUTION INTRAMUSCULAR; INTRAVENOUS at 11:10

## 2018-10-31 RX ADMIN — HYDROCODONE BITARTRATE AND ACETAMINOPHEN 1 TABLET: 5; 325 TABLET ORAL at 03:10

## 2018-10-31 RX ADMIN — CYCLOPENTOLATE HYDROCHLORIDE 1 DROP: 10 SOLUTION/ DROPS OPHTHALMIC at 09:10

## 2018-10-31 RX ADMIN — PHENYLEPHRINE HYDROCHLORIDE 1 DROP: 25 SOLUTION/ DROPS OPHTHALMIC at 09:10

## 2018-10-31 RX ADMIN — SODIUM CHLORIDE: 0.9 INJECTION, SOLUTION INTRAVENOUS at 09:10

## 2018-10-31 RX ADMIN — NEOSTIGMINE METHYLSULFATE 4 MG: 1 INJECTION INTRAVENOUS at 01:10

## 2018-10-31 RX ADMIN — PROPOFOL 200 MG: 10 INJECTION, EMULSION INTRAVENOUS at 11:10

## 2018-10-31 RX ADMIN — HYDROMORPHONE HYDROCHLORIDE 0.2 MG: 1 INJECTION, SOLUTION INTRAMUSCULAR; INTRAVENOUS; SUBCUTANEOUS at 02:10

## 2018-10-31 RX ADMIN — GLYCOPYRROLATE 0.4 MG: 0.2 INJECTION, SOLUTION INTRAMUSCULAR; INTRAVENOUS at 01:10

## 2018-10-31 RX ADMIN — ROCURONIUM BROMIDE 10 MG: 10 INJECTION, SOLUTION INTRAVENOUS at 12:10

## 2018-10-31 RX ADMIN — PREDNISOLONE ACETATE 1 DROP: 10 SUSPENSION OPHTHALMIC at 09:10

## 2018-10-31 RX ADMIN — ROCURONIUM BROMIDE 10 MG: 10 INJECTION, SOLUTION INTRAVENOUS at 11:10

## 2018-10-31 RX ADMIN — HOMATROPINE HYDROBROMIDE 1 DROP: 50 SOLUTION OPHTHALMIC at 09:10

## 2018-10-31 RX ADMIN — TROPICAMIDE 1 DROP: 10 SOLUTION/ DROPS OPHTHALMIC at 09:10

## 2018-10-31 RX ADMIN — ROCURONIUM BROMIDE 50 MG: 10 INJECTION, SOLUTION INTRAVENOUS at 11:10

## 2018-10-31 RX ADMIN — LIDOCAINE HYDROCHLORIDE 50 MG: 20 INJECTION, SOLUTION INTRAVENOUS at 11:10

## 2018-10-31 RX ADMIN — PREDNISOLONE ACETATE 1 DROP: 10 SUSPENSION OPHTHALMIC at 10:10

## 2018-10-31 RX ADMIN — MOXIFLOXACIN 1 DROP: 5 SOLUTION/ DROPS OPHTHALMIC at 10:10

## 2018-10-31 RX ADMIN — ONDANSETRON 4 MG: 2 INJECTION INTRAMUSCULAR; INTRAVENOUS at 11:10

## 2018-10-31 RX ADMIN — DEXAMETHASONE SODIUM PHOSPHATE 8 MG: 4 INJECTION, SOLUTION INTRAMUSCULAR; INTRAVENOUS at 11:10

## 2018-10-31 RX ADMIN — CYCLOPENTOLATE HYDROCHLORIDE 1 DROP: 10 SOLUTION/ DROPS OPHTHALMIC at 10:10

## 2018-10-31 RX ADMIN — PHENYLEPHRINE HYDROCHLORIDE 1 DROP: 25 SOLUTION/ DROPS OPHTHALMIC at 10:10

## 2018-10-31 RX ADMIN — KETOROLAC TROMETHAMINE 30 MG: 30 INJECTION, SOLUTION INTRAMUSCULAR; INTRAVENOUS at 11:10

## 2018-10-31 RX ADMIN — SODIUM CHLORIDE: 0.9 INJECTION, SOLUTION INTRAVENOUS at 12:10

## 2018-10-31 RX ADMIN — HOMATROPINE HYDROBROMIDE 1 DROP: 50 SOLUTION OPHTHALMIC at 10:10

## 2018-10-31 NOTE — PROGRESS NOTES
Patient agitated and inconsolable. Unable to orient patient. 4 RNs at bedside to hold patient for safety. Anesthesia called to the bedside for assistance. Pain medication given.

## 2018-10-31 NOTE — TRANSFER OF CARE
"Anesthesia Transfer of Care Note    Patient: Nirmal Oglesby    Procedure(s) Performed: Procedure(s) (LRB):  SCLERAL BUCKLING (Right)    Patient location: PACU    Anesthesia Type: general    Transport from OR: Transported from OR on 2-3 L/min O2 by NC with adequate spontaneous ventilation    Post pain: adequate analgesia    Post assessment: no apparent anesthetic complications and tolerated procedure well    Post vital signs: stable    Level of consciousness: sedated    Nausea/Vomiting: no nausea/vomiting    Complications: none    Transfer of care protocol was followed      Last vitals:   Visit Vitals  /66 (BP Location: Left arm, Patient Position: Lying)   Pulse 77   Temp 36.9 °C (98.5 °F) (Temporal)   Resp 16   Ht 6' 5" (1.956 m)   Wt 86.2 kg (190 lb)   SpO2 99%   BMI 22.53 kg/m²     "

## 2018-10-31 NOTE — PLAN OF CARE
Patient and spouse state they are ready to be discharged. Instructions, prescriptions and eye bag given to patient and family. Both verbalize understanding. Patient tolerating po liquids with no difficulty. Patient states pain is at a tolerable level for them. Anesthesia consent and surgical consent in chart upon patient's discharge from [facility].

## 2018-10-31 NOTE — ANESTHESIA PREPROCEDURE EVALUATION
10/31/2018  Nirmal Oglesby is a 23 y.o., male.    Anesthesia Evaluation    I have reviewed the Patient Summary Reports.    I have reviewed the Nursing Notes.      Review of Systems  Anesthesia Hx:  No problems with previous Anesthesia    Hematology/Oncology:  Hematology Normal   Oncology Normal     EENT/Dental:EENT/Dental Normal   Cardiovascular:  Cardiovascular Normal     Pulmonary:  Pulmonary Normal    Renal/:  Renal/ Normal     Hepatic/GI:   GERD    Musculoskeletal:  Musculoskeletal Normal    Neurological:  Neurology Normal    Endocrine:  Endocrine Normal    Dermatological:  Skin Normal    Psych:  Psychiatric Normal           Physical Exam  General:  Well nourished    Airway/Jaw/Neck:  Airway Findings: Mouth Opening: Normal Tongue: Normal  General Airway Assessment: Adult  Mallampati: I  TM Distance: Normal, at least 6 cm        Eyes/Ears/Nose:  EYES/EARS/NOSE FINDINGS: Normal   Dental:  Dental Findings: In tact   Chest/Lungs:  Chest/Lungs Clear    Heart/Vascular:  Heart Findings: Normal Heart murmur: negative Vascular Findings: Normal    Abdomen:  Abdomen Findings: Normal    Musculoskeletal:  Musculoskeletal Findings: Normal   Skin:  Skin Findings: Normal    Mental Status:  Mental Status Findings: Normal        Anesthesia Plan  Type of Anesthesia, risks & benefits discussed:  Anesthesia Type:  general  Patient's Preference:   Intra-op Monitoring Plan:   Intra-op Monitoring Plan Comments:   Post Op Pain Control Plan:   Post Op Pain Control Plan Comments:   Induction:   IV  Beta Blocker:  Patient is not currently on a Beta-Blocker (No further documentation required).       Informed Consent: Patient understands risks and agrees with Anesthesia plan.  Questions answered. Anesthesia consent signed with patient.  ASA Score: 2     Day of Surgery Review of History & Physical:    H&P update referred to the  surgeon.         Ready For Surgery From Anesthesia Perspective.

## 2018-10-31 NOTE — BRIEF OP NOTE
Pre-Op Dx: RRD/PVR OD    Post Op Dx: same    Procedure Performed: Scleral Buckle #240/#70 sleeve/25g PPV/SO removal/membrane stripping/infusion of PFO/EL/AFx/C3F8 14% OD  EL #1471, P 120-150mW, d 0.1s    Attending Surgeon: Roque    Assistant Surgeon: Ada    Anesthesia: GETA, retrobulbar injection of 4.0cc mixture 0.75%Marcaine, 2% Xylocaine    Estimated blood loss: Minimal    Complication: None    Specimen: None    Disposition: Stable to recovery    Findings/Outcome: infero nasal RD with GRT elevation. Flattened nicely after some peeling and PFO.  Good laser applied to posterior buckle    Date of Discharge: 10/31/18    Discharge Disposition: stable to recovery then home    F/U: tomorrow

## 2018-10-31 NOTE — DISCHARGE INSTRUCTIONS
Post Op Instructions:  Patient should Maintain Eye shield & Dressing until seen tomorrow in eye clinic  Tylenol as needed for general discomfort  Use Prescription for pain medication if pain is severe  Use Prescription for Nausea (Zofran) if nausea or vomiting  No excessive exercise   No Bending, Lifting or Straining  Call MD if significant pain or nausea / vomiting uncontrolled by medications  Call MD if temperature in excess of 101' F  Sleep on right side  Return to eye clinic for Post Op Examination tomorrow Morning.  Bring Medicine bag to tomorrow's appointment.      Recovery After Procedural Sedation (Adult)  You have been given medicine by vein to make you sleep during your surgery. This may have included both a pain medicine and sleeping medicine. Most of the effects have worn off. But you may still have some drowsiness for the next 6 to 8 hours.  Home care  Follow these guidelines when you get home:  · For the next 8 hours, you should be watched by a responsible adult. This person should make sure your condition is not getting worse.  · Don't drink any alcohol for the next 24 hours.  · Don't drive, operate dangerous machinery, or make important business or personal decisions during the next 24 hours.  Note: Your healthcare provider may tell you not to take any medicine by mouth for pain or sleep in the next 4 hours. These medicines may react with the medicines you were given in the hospital. This could cause a much stronger response than usual.  Follow-up care  Follow up with your healthcare provider if you are not alert and back to your usual level of activity within 12 hours.  When to seek medical advice  Call your healthcare provider right away if any of these occur:  · Drowsiness gets worse  · Weakness or dizziness gets worse  · Repeated vomiting  · You can't be awakened   Date Last Reviewed: 10/18/2016  © 0244-4676 [employer]. [address] [place] [address], [place] [address]. All rights  reserved. This information is not intended as a substitute for professional medical care. Always follow your healthcare professional's instructions.    PATIENT INSTRUCTIONS  POST-ANESTHESIA    IMMEDIATELY FOLLOWING SURGERY:  Do not drive or operate machinery for the first twenty four hours after surgery.  Do not make any important decisions for twenty four hours after surgery or while taking narcotic pain medications or sedatives.  If you develop intractable nausea and vomiting or a severe headache please notify your doctor immediately.    FOLLOW-UP:  Please make an appointment with your surgeon as instructed. You do not need to follow up with anesthesia unless specifically instructed to do so.    WOUND CARE INSTRUCTIONS (if applicable):  Keep a dry clean dressing on the anesthesia/puncture wound site if there is drainage.  Once the wound has quit draining you may leave it open to air.  Generally you should leave the bandage intact for twenty four hours unless there is drainage.  If the epidural site drains for more than 36-48 hours please call the anesthesia department.    QUESTIONS?:  Please feel free to call your physician or the hospital  if you have any questions, and they will be happy to assist you.       [facility] Anesthesia Department  [address] [facility]  [phone]

## 2018-10-31 NOTE — INTERVAL H&P NOTE
Patient seen and examined at bedside. Surgical site marked. No changes to H&P. Ok to proceed with surgery.

## 2018-10-31 NOTE — OP NOTE
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    PROCEDURE PERFORMED:  A scleral buckle #240 band with #70 sleeve, and a 25-gauge   pars plana vitrectomy with silicone oil removal, membrane stripping, infusion   of perfluoron, endolaser, air-fluid exchange, injection of C3F8 gas 14% to the   right eye.    ENDOLASER PARAMETERS:  Number of spots 1471, power 120 to 150 milliwatts,   duration 0.1 seconds.    ATTENDING SURGEON:  SHAVONNE Nevarez M.D.    ASSISTANT SURGEON:  Fellow, Allan Caballero.    ANESTHESIA:  General endotracheal anesthesia with a retrobulbar injection of 4.0   mL mixture of 0.75% Marcaine and 2% Xylocaine.    ESTIMATED BLOOD LOSS:  Minimal.    COMPLICATIONS:  None.    DISPOSITION:  Stable to recovery.    INDICATIONS FOR SURGERY:  This is a 23-year-old male with history of intraocular   metallic foreign body and endophthalmitis status post primary repair with   removal lensectomy and stabilization detachment with placement of silicone oil.    The patient's infectious process stabilized and his retina behaved nicely until   about two weeks prior to surgery where he developed an inferior scar tissue   inserted to separate the retina beneath the silicone oil.  Decision was made to   take the patient back to surgery to place a scleral buckle and stripped off the   membrane tissue to stabilize the retina to prevent further vision loss and   hopefully stabilize the eye longterm.  Risks, benefits, and alternatives of   surgery were discussed in detail with risks including loss of vision. loss of   eye, recurrent retinal detachment, infection, hemorrhage, glaucoma, hypotony,   ptosis and diplopia.  The patient voiced understanding and wished to proceed   with the  procedure.    DESCRIPTION OF PROCEDURE:  After proper informed consent was obtained, the   patient was brought back to the Operating Suite at [facility] where   general endotracheal anesthesia was induced.  The patient was prepped and draped   in normal sterile fashion for ophthalmic surgery.  A lid speculum was placed in   the right eye.  A 360-degree conjunctival peritomy was created with blunt   Kennedy scissors 360 degrees.  The intermuscular septae were  between   the four recti muscles.  The four recti muscles were isolated and secured with   4-0 silk ties.  Four horizontal belt loops were created approximately 40 mm   posterior to the limbus with a #57 blade and cleft palate blade.  A #240 band   was placed beneath the recti muscles and through these belt loops and secured   superotemporally with the #70 sleeve.  Then, the pars plana vitrectomy portion   was set up.  The 3-port 25-gauge pars plana vitrectomy was set up with the   infusion cannula inserted 3.5 mm posterior to the limbus.  The infusion cannula   was turned on only after observed to be free and clear of all underlying retinal   tissue.  Supranasal and supratemporal trocars were also placed 3.5 mm posterior   to the limbus.  The [facility] viscous fluid extraction device was used to remove the   silicone oil.  There was a couple of small remnant in the anterior chamber.    These were removed with the vitrector through the aphakic pupil.  Posterior   segment examination revealed an inferotemporal detachment with giant retinal   tear that was not tacked down from the prior laser extending towards the   inferior mid periphery.  There were some subretinal membranes and a couple   surface membranes which were stripped off with the vitrector.  The retina became   more free and mobile following this.  Perfluoron was infused to the eye to   flatten against the buckle edge.  The buckle was tightened to an appropriate   height externally and  then endolaser was applied internally 360 degrees in the   posterior aspect of the buckle.  The retina was nice and flat following these   maneuvers.  An air-fluid exchange was performed.  The perfluoron was removed   from the eye.  The superonasal trocars were removed and closed with interrupted   7-0 Vicryl suture.  A 14% C3F8 gas mixture was created, approximately 40 mL were   cycled through the eye and the supratemporal trocars were removed and closed   with Vicryl suture and the same was for the infusion cannula.  The eye was   normal pressure via palpation.  The edges of the scleral buckle were trimmed and   placed near the adjacent recti muscles.  The 4-0 silk ties were removed.  The   conjunctiva was brought forth and closed with several interrupted 6-0 plain gut   stitches.  A retrobulbar injection was provided with a cannula by 4 mL of 2%   lidocaine and 0.75% Marcaine.  Subconjunctival injections of vancomycin and   Decadron were given to the patient.  The drapes were removed from the patient.    He was washed free of Betadine prep solution.  Maxitrol ointment was placed in   the right eye.  The eye was patch shielded.  General anesthesia was reversed.    He was brought to the Recovery Room in stable condition, tolerating the   procedure well.  Dr. Nevarez was present for entire case.      DAM/IN  dd: 10/31/2018 14:14:49 (CDT)  td: 10/31/2018 15:09:48 (CDT)  Doc ID   #[id number]  Job ID #[id number]    CC:

## 2018-10-31 NOTE — H&P
Pre-Operative History & Physical  Ophthalmology      SUBJECTIVE:     History of Present Illness:  Patient is a 23 y.o. male presents with Retinal detachment of right eye with giant retinal tear [H33.031].    MEDICATIONS:   No medications prior to admission.       ALLERGIES: Review of patient's allergies indicates:  No Known Allergies    PAST MEDICAL HISTORY: No past medical history on file.  PAST SURGICAL HISTORY:   Past Surgical History:   Procedure Laterality Date    LYMPH NODE BIOPSY      benign    SCLERAL BUCKLING Right 10/17/2018    Performed by SHAVONNE Nevarez MD at [facility] OR [address] [place] [address]    TONSILLECTOMY      VITRECTOMY BY PARS PLANA APPROACH Right 9/3/2018    Procedure: VITRECTOMY, PARS PLANA APPROACH;  Surgeon: SHAVONNE Nevarez MD;  Location: [facility] OR [address] [place] [address];  Service: Ophthalmology;  Laterality: Right;  silicone oil injection, fluid air exchange, endolaser, peripheral iridotomy, ruptured globe revision for ruptured globe for foreign body, giant retinal tear, retinal detachment, endophthalmitis, traumatic cataract    VITRECTOMY, PARS PLANA APPROACH Right 9/3/2018    Performed by SHAVONNE Nevarez MD at [facility] OR [address] [place] [address]     PAST FAMILY HISTORY:   Family History   Problem Relation Age of Onset    Cancer Maternal Grandfather         lung cancer, also brain involvement     SOCIAL HISTORY:   Social History     Tobacco Use    Smoking status: Current Every Day Smoker     Packs/day: 1.00    Smokeless tobacco: Current User   Substance Use Topics    Alcohol use: No    Drug use: No        MENTAL STATUS: Alert    REVIEW OF SYSTEMS: Negative    OBJECTIVE:     Vital Signs (Most Recent)       Physical Exam:  General: NAD  HEENT: Atraumatic  Lungs: Adequate respirations, LCTAB  Heart: RRR, No murmur  Abdomen: Soft NT    ASSESSMENT/PLAN:     Patient is a 23 y.o. male with Retinal detachment of right eye with giant retinal tear [H33.031].     - Plan for surgical correction Plan /270 with 25g PPV/Silicone  oil removal/membrane stripping/EL/C3F8 OD for RRD with PVR OD     GETA  LOC 2.5 H   - Risks/benefits/alternatives of the procedure including, but not limited to scarring, bleeding, infection, loss or decreased vision, and/or need for possible repeat surgery discussed with the patient and family.   - Informed consent obtained prior to surgery and the patient/family voiced good understanding.    Mauricio Caballero  10/30/2018  8:49 PM

## 2018-11-01 ENCOUNTER — OFFICE VISIT (OUTPATIENT)
Dept: OPHTHALMOLOGY | Facility: CLINIC | Age: 23
End: 2018-11-01
Payer: MEDICAID

## 2018-11-01 VITALS — HEART RATE: 50 BPM | SYSTOLIC BLOOD PRESSURE: 123 MMHG | DIASTOLIC BLOOD PRESSURE: 80 MMHG

## 2018-11-01 DIAGNOSIS — H35.21 PROLIFERATIVE VITREORETINOPATHY, RIGHT: Primary | ICD-10-CM

## 2018-11-01 DIAGNOSIS — H33.031 GIANT RETINAL TEAR, RIGHT: ICD-10-CM

## 2018-11-01 PROCEDURE — 99213 OFFICE O/P EST LOW 20 MIN: CPT | Mod: PBBFAC,PO | Performed by: OPHTHALMOLOGY

## 2018-11-01 PROCEDURE — 99999 PR PBB SHADOW E&M-EST. PATIENT-LVL III: CPT | Mod: PBBFAC,,, | Performed by: OPHTHALMOLOGY

## 2018-11-01 PROCEDURE — 99024 POSTOP FOLLOW-UP VISIT: CPT | Mod: ,,, | Performed by: OPHTHALMOLOGY

## 2018-11-01 NOTE — PROGRESS NOTES
HPI     Post-op Evaluation      Additional comments: 1 day check              Comments     1 day post op- Patient had a lot of pain last night and this am. He rates the pain as a 10 on pain scale. OS hurts as well. 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    Start gtts, ointment/shield QHS  Keflex BID        4 days NS

## 2018-11-01 NOTE — ANESTHESIA POSTPROCEDURE EVALUATION
"Anesthesia Post Evaluation    Patient: Nirmal Oglesby    Procedure(s) Performed: Procedure(s) (LRB):  SCLERAL BUCKLING (Right)    Final Anesthesia Type: general  Patient location during evaluation: PACU  Patient participation: Yes- Able to Participate  Level of consciousness: awake and alert  Pain management: adequate  Airway patency: patent  PONV status at discharge: No PONV  Anesthetic complications: no      Cardiovascular status: blood pressure returned to baseline  Respiratory status: unassisted, spontaneous ventilation and room air  Hydration status: euvolemic  Follow-up not needed.        Visit Vitals  /60   Pulse (!) 52   Temp 36.9 °C (98.5 °F) (Skin)   Resp 16   Ht 6' 5" (1.956 m)   Wt 86.2 kg (190 lb)   SpO2 100%   BMI 22.53 kg/m²       Pain/Casey Score: Pain Assessment Performed: Yes (10/31/2018  3:30 PM)  Presence of Pain: complains of pain/discomfort (10/31/2018  3:30 PM)  Pain Rating Prior to Med Admin: 7 (10/31/2018  3:52 PM)  Casey Score: 9 (10/31/2018  2:00 PM)        "

## 2018-11-05 ENCOUNTER — OFFICE VISIT (OUTPATIENT)
Dept: OPHTHALMOLOGY | Facility: CLINIC | Age: 23
End: 2018-11-05
Payer: MEDICAID

## 2018-11-05 DIAGNOSIS — H35.21 PROLIFERATIVE VITREORETINOPATHY, RIGHT: Primary | ICD-10-CM

## 2018-11-05 DIAGNOSIS — H33.21 RETINAL DETACHMENT, RIGHT: ICD-10-CM

## 2018-11-05 DIAGNOSIS — H33.031 GIANT RETINAL TEAR, RIGHT: ICD-10-CM

## 2018-11-05 LAB
ACID FAST MOD KINY STN SPEC: NORMAL
MYCOBACTERIUM SPEC QL CULT: NORMAL

## 2018-11-05 PROCEDURE — 99999 PR PBB SHADOW E&M-EST. PATIENT-LVL III: CPT | Mod: PBBFAC,,, | Performed by: OPHTHALMOLOGY

## 2018-11-05 PROCEDURE — 99024 POSTOP FOLLOW-UP VISIT: CPT | Mod: ,,, | Performed by: OPHTHALMOLOGY

## 2018-11-05 PROCEDURE — 99213 OFFICE O/P EST LOW 20 MIN: CPT | Mod: PBBFAC,PO | Performed by: OPHTHALMOLOGY

## 2018-11-05 NOTE — PROGRESS NOTES
HPI     Eye Problem      Additional comments: 4 day check              Comments     DLS 11/1/18- He is still not seeing out of OD    PF x 4  HA x 4  vigamox x 4  Johnnie qhs      HPI     Post-op Evaluation      Additional comments: 1 day check              Comments     1 day post op- Patient had a lot of pain last night and this am. He rates the pain as a 10 on pain scale. OS hurts as well. 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  s/p 23-g PPV/PPL/ repair of corneal laceration, intraocular foreign body removal, EL/AFx/SO/Vanc OD (9/3/18)       Doing well, Good IOP    Poor prognosis given IOFB embedded in temporal macula and was present x 3 days    Continue PF TONEY Q day    Keep ointment/shield    Completed Avelox PO x 14 days    10/18 - superiorly attached, inferior detached beneath oil    s/pSB 240/70 with 25g PPV/Silicone oil removal/membrane stripping/EL/C3F8 OD for RRD with PVR OD    Va back to LP  Flat beneath bubble    continue gtts, ointment/shield QHS  Then PF/HA BID  Keflex BID until out  Side sleep      1 month

## 2018-11-19 ENCOUNTER — OFFICE VISIT (OUTPATIENT)
Dept: OPHTHALMOLOGY | Facility: CLINIC | Age: 23
End: 2018-11-19
Payer: MEDICAID

## 2018-11-19 DIAGNOSIS — H33.21 RETINAL DETACHMENT, RIGHT: Primary | ICD-10-CM

## 2018-11-19 DIAGNOSIS — H35.21 PROLIFERATIVE VITREORETINOPATHY, RIGHT: ICD-10-CM

## 2018-11-19 PROCEDURE — 99213 OFFICE O/P EST LOW 20 MIN: CPT | Mod: PBBFAC,PO | Performed by: OPHTHALMOLOGY

## 2018-11-19 PROCEDURE — 99999 PR PBB SHADOW E&M-EST. PATIENT-LVL III: CPT | Mod: PBBFAC,,, | Performed by: OPHTHALMOLOGY

## 2018-11-19 PROCEDURE — 99024 POSTOP FOLLOW-UP VISIT: CPT | Mod: ,,, | Performed by: OPHTHALMOLOGY

## 2018-11-19 RX ORDER — DOXYCYCLINE 100 MG/1
100 CAPSULE ORAL
COMMUNITY
Start: 2018-10-17 | End: 2018-11-19

## 2018-11-19 RX ORDER — CEPHALEXIN 500 MG/1
CAPSULE ORAL
Refills: 0 | COMMUNITY
Start: 2018-10-31 | End: 2018-11-19 | Stop reason: ALTCHOICE

## 2018-11-19 RX ORDER — PREDNISOLONE ACETATE 10 MG/ML
1 SUSPENSION/ DROPS OPHTHALMIC 4 TIMES DAILY
Qty: 5 ML | Refills: 3 | Status: SHIPPED | OUTPATIENT
Start: 2018-11-19 | End: 2018-12-20 | Stop reason: SDUPTHER

## 2018-11-19 RX ORDER — METHOCARBAMOL 500 MG/1
1000 TABLET, FILM COATED ORAL EVERY 6 HOURS PRN
COMMUNITY
Start: 2017-11-08 | End: 2018-11-19

## 2018-11-19 RX ORDER — ALBUTEROL SULFATE 90 UG/1
AEROSOL, METERED RESPIRATORY (INHALATION)
COMMUNITY
Start: 2018-10-17 | End: 2018-11-19 | Stop reason: SDUPTHER

## 2018-11-19 RX ORDER — NAPROXEN 500 MG/1
500 TABLET ORAL EVERY 12 HOURS PRN
COMMUNITY
Start: 2017-11-08 | End: 2018-12-13

## 2018-11-19 RX ORDER — TRAMADOL HYDROCHLORIDE 50 MG/1
50 TABLET ORAL EVERY 6 HOURS PRN
COMMUNITY
Start: 2017-11-08 | End: 2018-11-19

## 2018-11-19 NOTE — PROGRESS NOTES
HPI     DLS 11/05/18- Dr. Nevarez      Pt states that vision is the same nothing change.  Has pain in the OD     Eye Med(s) - Pred QD - OD                        HomeAtropine  QD - OD      Intraocular metalic FB OD   s/p 23-g PPV/PPL/ repair of corneal laceration, intraocular foreign body   removal, EL/AFx/SO OD (9/3/18)     RRD with PVR OD   S/p /270 with 25g PPV/SO removal/membrane stripping/EL/C3F8 OD   (10/31/18)    Last edited by Kyra Pizano MA on 11/19/2018 10:18 AM. (History)      HPI     Eye Problem      Additional comments: 4 day check              Comments     DLS 11/1/18- He is still not seeing out of OD    PF x 4  HA x 4  vigamox x 4  Johnnie qhs      HPI     Post-op Evaluation      Additional comments: 1 day check              Comments     1 day post op- Patient had a lot of pain last night and this am. He rates the pain as a 10 on pain scale. OS hurts as well. 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

## 2018-12-05 ENCOUNTER — TELEPHONE (OUTPATIENT)
Dept: OPHTHALMOLOGY | Facility: CLINIC | Age: 23
End: 2018-12-05

## 2018-12-05 NOTE — TELEPHONE ENCOUNTER
----- Message from Marga Taylor sent at 12/5/2018  1:32 PM CST -----  Good afternoon,  Please see the email below from the workers  nurse  requesting work status on Mr. Oglesby.    Thanks,  Marga  EXT [phone]    From: Balaji Farias <[email or web address]>   Sent: Tuesday, December 04, 2018 2:08 PM  To: Marga Taylor <[email or web address]>  Subject: [EXTERNAL] RE: Mendel Nirmal        Thank you Marga! Can you request a work status from Dr Nevarez if there is none on file?    Sincerely,     Balaji Farias, RN, BSN  Nurse      [address] [address]  [place] [address]  Office: [phone]  Direct Fax: [phone]  Email:  [email or web address]

## 2018-12-10 ENCOUNTER — OFFICE VISIT (OUTPATIENT)
Dept: OPHTHALMOLOGY | Facility: CLINIC | Age: 23
End: 2018-12-10
Payer: MEDICAID

## 2018-12-10 ENCOUNTER — TELEPHONE (OUTPATIENT)
Dept: OPHTHALMOLOGY | Facility: CLINIC | Age: 23
End: 2018-12-10

## 2018-12-10 DIAGNOSIS — H33.21 RETINAL DETACHMENT, RIGHT: ICD-10-CM

## 2018-12-10 DIAGNOSIS — H33.21 DETACHED RETINA, RIGHT: ICD-10-CM

## 2018-12-10 DIAGNOSIS — H40.51X4 GLAUCOMA OF RIGHT EYE SECONDARY TO OTHER EYE DISORDER, INDETERMINATE STAGE: Primary | ICD-10-CM

## 2018-12-10 DIAGNOSIS — H40.51X4 NEOVASCULAR GLAUCOMA OF RIGHT EYE, INDETERMINATE STAGE: Primary | ICD-10-CM

## 2018-12-10 DIAGNOSIS — H35.21 NONDIABETIC PROLIFERATIVE RETINOPATHY, RIGHT: ICD-10-CM

## 2018-12-10 DIAGNOSIS — H35.21 PROLIFERATIVE VITREORETINOPATHY, RIGHT: ICD-10-CM

## 2018-12-10 PROBLEM — H40.51X0 NEOVASCULAR GLAUCOMA, RIGHT EYE: Status: ACTIVE | Noted: 2018-12-10

## 2018-12-10 PROCEDURE — 99999 PR PBB SHADOW E&M-EST. PATIENT-LVL III: CPT | Mod: PBBFAC,,, | Performed by: OPHTHALMOLOGY

## 2018-12-10 PROCEDURE — 67028 INJECTION EYE DRUG: CPT | Performed by: OPHTHALMOLOGY

## 2018-12-10 PROCEDURE — 67028 INJECTION EYE DRUG: CPT | Mod: 79,S$PBB,RT, | Performed by: OPHTHALMOLOGY

## 2018-12-10 PROCEDURE — 99213 OFFICE O/P EST LOW 20 MIN: CPT | Mod: PBBFAC,PO | Performed by: OPHTHALMOLOGY

## 2018-12-10 PROCEDURE — 99024 POSTOP FOLLOW-UP VISIT: CPT | Mod: ,,, | Performed by: OPHTHALMOLOGY

## 2018-12-10 RX ORDER — PREDNISOLONE ACETATE 10 MG/ML
SUSPENSION/ DROPS OPHTHALMIC
COMMUNITY
End: 2018-12-10 | Stop reason: SDUPTHER

## 2018-12-10 RX ORDER — OXYCODONE AND ACETAMINOPHEN 10; 325 MG/1; MG/1
1 TABLET ORAL EVERY 6 HOURS PRN
Qty: 30 TABLET | Refills: 0 | Status: SHIPPED | OUTPATIENT
Start: 2018-12-10 | End: 2018-12-20

## 2018-12-10 RX ORDER — OXYCODONE AND ACETAMINOPHEN 7.5; 325 MG/1; MG/1
TABLET ORAL EVERY 4 HOURS PRN
COMMUNITY
Start: 2018-12-09 | End: 2018-12-20

## 2018-12-10 RX ADMIN — BEVACIZUMAB 1.25 MG: 100 INJECTION, SOLUTION INTRAVENOUS at 03:12

## 2018-12-10 NOTE — PATIENT INSTRUCTIONS

## 2018-12-10 NOTE — PROGRESS NOTES
aHPI     DLS 11/19/18- Dr. Nevarez      Pt states that vision is the same nothing change no vision eye in pain on the scale of a 10 did not get the pain med filled.     Eye Med(s) - Pred Forte TID - OD                        HomeAtropine  QD - OD      Intraocular metalic FB OD   s/p 23-g PPV/PPL/ repair of corneal laceration, intraocular foreign body removal, EL/AFx/SO OD (9/3/18)     RRD with PVR OD   S/p /270 with 25g PPV/SO removal/membrane stripping/EL/C3F8 OD   (10/31/18)    Last edited by Kyra Pizano MA on 12/10/2018  2:18 PM. 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  s/p 23-g PPV/PPL/ repair of corneal laceration, intraocular foreign body removal, EL/AFx/SO/Vanc OD (9/3/18)       Doing well, Good IOP    Poor prognosis given IOFB embedded in temporal macula and was present x 3 days    Continue PF TONEY Q day    Keep ointment/shield    Completed Avelox PO x 14 days    10/18 - superiorly attached, inferior detached beneath oil    S/p /70 with 25g PPV/Silicone oil removal/membrane stripping/EL/C3F8 OD for RRD with PVR OD 10/31/18    12/10/18 - pain and loss of Vision x 3 days  Total RD now that C3F8 bubble small with florid NV/NVG  Va back to Bare LP with intense pain - will need Perc refill    Avastin OD today    Plan 25g PPV/PFO/EL/PI/5000cs Silicone Oil OD for REcurrent RRD and PVR    LMA  LOC 60 min    Risks, benefits, and alternatives to treatment discussed in detail with the patient.  The patient voiced understanding and wished to proceed with the procedure      continue PF/HA QID    Side sleep      To OR in 2 days      Risks, benefits, and alternatives to treatment discussed in detail with the patient.  The patient voiced understanding and wished to proceed with the procedure    Injection Procedure Note:  Diagnosis: RRD with NVG OD    Patient Identified and Time Out complete  Topical Proparacaine and Betadine.  Inject  Avastin OD at 6:00 @ 3.5-4mm posterior to limbus  Post Operative Dx: Same  Complications: None  Follow up as above.

## 2018-12-11 ENCOUNTER — TELEPHONE (OUTPATIENT)
Dept: OPHTHALMOLOGY | Facility: CLINIC | Age: 23
End: 2018-12-11

## 2018-12-12 ENCOUNTER — ANESTHESIA (OUTPATIENT)
Dept: SURGERY | Facility: HOSPITAL | Age: 23
End: 2018-12-12
Payer: MEDICAID

## 2018-12-12 ENCOUNTER — ANESTHESIA EVENT (OUTPATIENT)
Dept: SURGERY | Facility: HOSPITAL | Age: 23
End: 2018-12-12
Payer: MEDICAID

## 2018-12-12 ENCOUNTER — HOSPITAL ENCOUNTER (OUTPATIENT)
Facility: HOSPITAL | Age: 23
Discharge: HOME | End: 2018-12-12
Attending: OPHTHALMOLOGY | Admitting: OPHTHALMOLOGY
Payer: MEDICAID

## 2018-12-12 VITALS
HEART RATE: 58 BPM | TEMPERATURE: 98 F | OXYGEN SATURATION: 98 % | RESPIRATION RATE: 14 BRPM | WEIGHT: 180 LBS | HEIGHT: 77 IN | BODY MASS INDEX: 21.25 KG/M2 | DIASTOLIC BLOOD PRESSURE: 81 MMHG | SYSTOLIC BLOOD PRESSURE: 129 MMHG

## 2018-12-12 DIAGNOSIS — H33.031 GIANT RETINAL TEAR, RIGHT: ICD-10-CM

## 2018-12-12 DIAGNOSIS — H35.21 PROLIFERATIVE VITREORETINOPATHY, RIGHT: ICD-10-CM

## 2018-12-12 DIAGNOSIS — H33.22 RETINAL DETACHMENT, LEFT: ICD-10-CM

## 2018-12-12 DIAGNOSIS — H33.21 RETINAL DETACHMENT, RIGHT: Primary | ICD-10-CM

## 2018-12-12 DIAGNOSIS — H40.51X0 NVG (NEOVASCULAR GLAUCOMA), RIGHT, STAGE UNSPECIFIED: ICD-10-CM

## 2018-12-12 PROCEDURE — 67113 REPAIR RETINAL DETACH CPLX: CPT | Mod: 78,RT,, | Performed by: OPHTHALMOLOGY

## 2018-12-12 PROCEDURE — 25000003 PHARM REV CODE 250: Performed by: OPHTHALMOLOGY

## 2018-12-12 PROCEDURE — 36000707: Performed by: OPHTHALMOLOGY

## 2018-12-12 PROCEDURE — 00145 ANES PX EYE VITREORTNL SURG: CPT | Performed by: OPHTHALMOLOGY

## 2018-12-12 PROCEDURE — 63600175 PHARM REV CODE 636 W HCPCS: Performed by: ANESTHESIOLOGY

## 2018-12-12 PROCEDURE — 71000045 HC DOSC ROUTINE RECOVERY EA ADD'L HR: Performed by: OPHTHALMOLOGY

## 2018-12-12 PROCEDURE — 25000003 PHARM REV CODE 250: Performed by: NURSE ANESTHETIST, CERTIFIED REGISTERED

## 2018-12-12 PROCEDURE — 63600175 PHARM REV CODE 636 W HCPCS: Performed by: OPHTHALMOLOGY

## 2018-12-12 PROCEDURE — D9220A PRA ANESTHESIA: Mod: CRNA,,, | Performed by: NURSE ANESTHETIST, CERTIFIED REGISTERED

## 2018-12-12 PROCEDURE — C1784 OCULAR DEV, INTRAOP, DET RET: HCPCS | Performed by: OPHTHALMOLOGY

## 2018-12-12 PROCEDURE — 63600175 PHARM REV CODE 636 W HCPCS: Performed by: NURSE ANESTHETIST, CERTIFIED REGISTERED

## 2018-12-12 PROCEDURE — C1814 RETINAL TAMP, SILICONE OIL: HCPCS | Performed by: OPHTHALMOLOGY

## 2018-12-12 PROCEDURE — D9220A PRA ANESTHESIA: Mod: ANES,,, | Performed by: ANESTHESIOLOGY

## 2018-12-12 PROCEDURE — 25000003 PHARM REV CODE 250: Performed by: ANESTHESIOLOGY

## 2018-12-12 PROCEDURE — 27201423 OPTIME MED/SURG SUP & DEVICES STERILE SUPPLY: Performed by: OPHTHALMOLOGY

## 2018-12-12 PROCEDURE — 37000008 HC ANESTHESIA 1ST 15 MINUTES: Performed by: OPHTHALMOLOGY

## 2018-12-12 PROCEDURE — 36000706: Performed by: OPHTHALMOLOGY

## 2018-12-12 PROCEDURE — 71000015 HC POSTOP RECOV 1ST HR: Performed by: OPHTHALMOLOGY

## 2018-12-12 PROCEDURE — S0020 INJECTION, BUPIVICAINE HYDRO: HCPCS | Performed by: OPHTHALMOLOGY

## 2018-12-12 PROCEDURE — 71000044 HC DOSC ROUTINE RECOVERY FIRST HOUR: Performed by: OPHTHALMOLOGY

## 2018-12-12 PROCEDURE — 37000009 HC ANESTHESIA EA ADD 15 MINS: Performed by: OPHTHALMOLOGY

## 2018-12-12 RX ORDER — EPINEPHRINE 1 MG/ML
INJECTION, SOLUTION INTRACARDIAC; INTRAMUSCULAR; INTRAVENOUS; SUBCUTANEOUS
Status: DISCONTINUED
Start: 2018-12-12 | End: 2018-12-12 | Stop reason: HOSPADM

## 2018-12-12 RX ORDER — NEOMYCIN SULFATE, POLYMYXIN B SULFATE, AND DEXAMETHASONE 3.5; 10000; 1 MG/G; [USP'U]/G; MG/G
OINTMENT OPHTHALMIC
Status: DISCONTINUED
Start: 2018-12-12 | End: 2018-12-12 | Stop reason: HOSPADM

## 2018-12-12 RX ORDER — DEXAMETHASONE SODIUM PHOSPHATE 4 MG/ML
INJECTION, SOLUTION INTRA-ARTICULAR; INTRALESIONAL; INTRAMUSCULAR; INTRAVENOUS; SOFT TISSUE
Status: DISCONTINUED | OUTPATIENT
Start: 2018-12-12 | End: 2018-12-12 | Stop reason: HOSPADM

## 2018-12-12 RX ORDER — DEXAMETHASONE SODIUM PHOSPHATE 4 MG/ML
INJECTION, SOLUTION INTRA-ARTICULAR; INTRALESIONAL; INTRAMUSCULAR; INTRAVENOUS; SOFT TISSUE
Status: DISCONTINUED
Start: 2018-12-12 | End: 2018-12-12 | Stop reason: HOSPADM

## 2018-12-12 RX ORDER — NEOMYCIN SULFATE, POLYMYXIN B SULFATE, AND DEXAMETHASONE 3.5; 10000; 1 MG/G; [USP'U]/G; MG/G
OINTMENT OPHTHALMIC
Status: DISCONTINUED | OUTPATIENT
Start: 2018-12-12 | End: 2018-12-12 | Stop reason: HOSPADM

## 2018-12-12 RX ORDER — SODIUM CHLORIDE 9 MG/ML
INJECTION, SOLUTION INTRAVENOUS CONTINUOUS
Status: DISCONTINUED | OUTPATIENT
Start: 2018-12-12 | End: 2018-12-12 | Stop reason: HOSPADM

## 2018-12-12 RX ORDER — ACETAMINOPHEN 10 MG/ML
INJECTION, SOLUTION INTRAVENOUS
Status: DISCONTINUED | OUTPATIENT
Start: 2018-12-12 | End: 2018-12-12

## 2018-12-12 RX ORDER — LIDOCAINE HCL/PF 100 MG/5ML
SYRINGE (ML) INTRAVENOUS
Status: DISCONTINUED | OUTPATIENT
Start: 2018-12-12 | End: 2018-12-12

## 2018-12-12 RX ORDER — PROMETHAZINE HYDROCHLORIDE 25 MG/1
25 SUPPOSITORY RECTAL EVERY 6 HOURS PRN
Qty: 12 SUPPOSITORY | Refills: 1 | Status: SHIPPED | OUTPATIENT
Start: 2018-12-12 | End: 2018-12-13

## 2018-12-12 RX ORDER — ACETAMINOPHEN 325 MG/1
650 TABLET ORAL EVERY 4 HOURS PRN
Status: DISCONTINUED | OUTPATIENT
Start: 2018-12-12 | End: 2018-12-12 | Stop reason: HOSPADM

## 2018-12-12 RX ORDER — VANCOMYCIN HYDROCHLORIDE 500 MG/10ML
INJECTION, POWDER, LYOPHILIZED, FOR SOLUTION INTRAVENOUS
Status: DISCONTINUED
Start: 2018-12-12 | End: 2018-12-12 | Stop reason: HOSPADM

## 2018-12-12 RX ORDER — SODIUM CHLORIDE 9 MG/ML
INJECTION, SOLUTION INTRAVENOUS CONTINUOUS PRN
Status: DISCONTINUED | OUTPATIENT
Start: 2018-12-12 | End: 2018-12-12

## 2018-12-12 RX ORDER — PREDNISOLONE ACETATE 10 MG/ML
1 SUSPENSION/ DROPS OPHTHALMIC
Status: DISCONTINUED | OUTPATIENT
Start: 2018-12-12 | End: 2018-12-12 | Stop reason: HOSPADM

## 2018-12-12 RX ORDER — PROPOFOL 10 MG/ML
VIAL (ML) INTRAVENOUS
Status: DISCONTINUED | OUTPATIENT
Start: 2018-12-12 | End: 2018-12-12

## 2018-12-12 RX ORDER — FENTANYL CITRATE 50 UG/ML
INJECTION, SOLUTION INTRAMUSCULAR; INTRAVENOUS
Status: DISCONTINUED | OUTPATIENT
Start: 2018-12-12 | End: 2018-12-12

## 2018-12-12 RX ORDER — LIDOCAINE HYDROCHLORIDE 20 MG/ML
INJECTION, SOLUTION EPIDURAL; INFILTRATION; INTRACAUDAL; PERINEURAL
Status: DISCONTINUED
Start: 2018-12-12 | End: 2018-12-12 | Stop reason: HOSPADM

## 2018-12-12 RX ORDER — CYCLOPENTOLATE HYDROCHLORIDE 10 MG/ML
1 SOLUTION/ DROPS OPHTHALMIC
Status: DISCONTINUED | OUTPATIENT
Start: 2018-12-12 | End: 2018-12-12 | Stop reason: HOSPADM

## 2018-12-12 RX ORDER — FENTANYL CITRATE 50 UG/ML
50 INJECTION, SOLUTION INTRAMUSCULAR; INTRAVENOUS EVERY 5 MIN PRN
Status: DISCONTINUED | OUTPATIENT
Start: 2018-12-12 | End: 2018-12-12 | Stop reason: HOSPADM

## 2018-12-12 RX ORDER — DEXMEDETOMIDINE HYDROCHLORIDE 100 UG/ML
INJECTION, SOLUTION INTRAVENOUS
Status: DISCONTINUED | OUTPATIENT
Start: 2018-12-12 | End: 2018-12-12

## 2018-12-12 RX ORDER — VANCOMYCIN HYDROCHLORIDE 500 MG/10ML
INJECTION, POWDER, LYOPHILIZED, FOR SOLUTION INTRAVENOUS
Status: DISCONTINUED | OUTPATIENT
Start: 2018-12-12 | End: 2018-12-12 | Stop reason: HOSPADM

## 2018-12-12 RX ORDER — ACETAMINOPHEN 10 MG/ML
INJECTION, SOLUTION INTRAVENOUS
Status: COMPLETED
Start: 2018-12-12 | End: 2018-12-12

## 2018-12-12 RX ORDER — MIDAZOLAM HYDROCHLORIDE 1 MG/ML
INJECTION, SOLUTION INTRAMUSCULAR; INTRAVENOUS
Status: DISCONTINUED | OUTPATIENT
Start: 2018-12-12 | End: 2018-12-12

## 2018-12-12 RX ORDER — TROPICAMIDE 10 MG/ML
1 SOLUTION/ DROPS OPHTHALMIC
Status: DISCONTINUED | OUTPATIENT
Start: 2018-12-12 | End: 2018-12-12 | Stop reason: HOSPADM

## 2018-12-12 RX ORDER — TETRACAINE HYDROCHLORIDE 5 MG/ML
1 SOLUTION OPHTHALMIC
Status: DISCONTINUED | OUTPATIENT
Start: 2018-12-12 | End: 2018-12-12 | Stop reason: HOSPADM

## 2018-12-12 RX ORDER — ONDANSETRON 8 MG/1
8 TABLET, ORALLY DISINTEGRATING ORAL EVERY 8 HOURS PRN
Status: DISCONTINUED | OUTPATIENT
Start: 2018-12-12 | End: 2018-12-12 | Stop reason: HOSPADM

## 2018-12-12 RX ORDER — PHENYLEPHRINE HYDROCHLORIDE 25 MG/ML
1 SOLUTION/ DROPS OPHTHALMIC
Status: DISCONTINUED | OUTPATIENT
Start: 2018-12-12 | End: 2018-12-12 | Stop reason: HOSPADM

## 2018-12-12 RX ORDER — LIDOCAINE HYDROCHLORIDE 10 MG/ML
1 INJECTION, SOLUTION EPIDURAL; INFILTRATION; INTRACAUDAL; PERINEURAL ONCE
Status: COMPLETED | OUTPATIENT
Start: 2018-12-12 | End: 2018-12-12

## 2018-12-12 RX ORDER — HYDROCODONE BITARTRATE AND ACETAMINOPHEN 5; 325 MG/1; MG/1
1 TABLET ORAL EVERY 4 HOURS PRN
Status: DISCONTINUED | OUTPATIENT
Start: 2018-12-12 | End: 2018-12-12 | Stop reason: HOSPADM

## 2018-12-12 RX ORDER — EPINEPHRINE CONVENIENCE KIT 1 MG/ML(1)
KIT INTRAMUSCULAR; SUBCUTANEOUS
Status: DISCONTINUED | OUTPATIENT
Start: 2018-12-12 | End: 2018-12-12 | Stop reason: HOSPADM

## 2018-12-12 RX ORDER — LIDOCAINE HYDROCHLORIDE 20 MG/ML
INJECTION, SOLUTION EPIDURAL; INFILTRATION; INTRACAUDAL; PERINEURAL
Status: DISCONTINUED | OUTPATIENT
Start: 2018-12-12 | End: 2018-12-12 | Stop reason: HOSPADM

## 2018-12-12 RX ORDER — MOXIFLOXACIN 5 MG/ML
1 SOLUTION/ DROPS OPHTHALMIC
Status: DISCONTINUED | OUTPATIENT
Start: 2018-12-12 | End: 2018-12-12 | Stop reason: HOSPADM

## 2018-12-12 RX ORDER — BUPIVACAINE HYDROCHLORIDE 7.5 MG/ML
INJECTION, SOLUTION EPIDURAL; RETROBULBAR
Status: DISCONTINUED | OUTPATIENT
Start: 2018-12-12 | End: 2018-12-12 | Stop reason: HOSPADM

## 2018-12-12 RX ORDER — SODIUM CHLORIDE 0.9 % (FLUSH) 0.9 %
3 SYRINGE (ML) INJECTION
Status: DISCONTINUED | OUTPATIENT
Start: 2018-12-12 | End: 2018-12-12 | Stop reason: HOSPADM

## 2018-12-12 RX ORDER — BUPIVACAINE HYDROCHLORIDE 7.5 MG/ML
INJECTION, SOLUTION EPIDURAL; RETROBULBAR
Status: DISCONTINUED
Start: 2018-12-12 | End: 2018-12-12 | Stop reason: HOSPADM

## 2018-12-12 RX ADMIN — MIDAZOLAM HYDROCHLORIDE 2 MG: 1 INJECTION, SOLUTION INTRAMUSCULAR; INTRAVENOUS at 11:12

## 2018-12-12 RX ADMIN — SODIUM CHLORIDE: 0.9 INJECTION, SOLUTION INTRAVENOUS at 11:12

## 2018-12-12 RX ADMIN — FENTANYL CITRATE 50 MCG: 50 INJECTION, SOLUTION INTRAMUSCULAR; INTRAVENOUS at 11:12

## 2018-12-12 RX ADMIN — HOMATROPINE HYDROBROMIDE 1 DROP: 50 SOLUTION OPHTHALMIC at 11:12

## 2018-12-12 RX ADMIN — PHENYLEPHRINE HYDROCHLORIDE 1 DROP: 25 SOLUTION/ DROPS OPHTHALMIC at 11:12

## 2018-12-12 RX ADMIN — DEXMEDETOMIDINE HYDROCHLORIDE 4 MCG: 100 INJECTION, SOLUTION, CONCENTRATE INTRAVENOUS at 02:12

## 2018-12-12 RX ADMIN — MOXIFLOXACIN 1 DROP: 5 SOLUTION/ DROPS OPHTHALMIC at 11:12

## 2018-12-12 RX ADMIN — FENTANYL CITRATE 50 MCG: 50 INJECTION, SOLUTION INTRAMUSCULAR; INTRAVENOUS at 01:12

## 2018-12-12 RX ADMIN — PREDNISOLONE ACETATE 1 DROP: 10 SUSPENSION/ DROPS OPHTHALMIC at 11:12

## 2018-12-12 RX ADMIN — TROPICAMIDE 1 DROP: 10 SOLUTION/ DROPS OPHTHALMIC at 11:12

## 2018-12-12 RX ADMIN — HYDROCODONE BITARTRATE AND ACETAMINOPHEN 1 TABLET: 5; 325 TABLET ORAL at 03:12

## 2018-12-12 RX ADMIN — FENTANYL CITRATE 50 MCG: 50 INJECTION, SOLUTION INTRAMUSCULAR; INTRAVENOUS at 02:12

## 2018-12-12 RX ADMIN — PROPOFOL 200 MG: 10 INJECTION, EMULSION INTRAVENOUS at 12:12

## 2018-12-12 RX ADMIN — DEXMEDETOMIDINE HYDROCHLORIDE 16 MCG: 100 INJECTION, SOLUTION, CONCENTRATE INTRAVENOUS at 01:12

## 2018-12-12 RX ADMIN — ACETAMINOPHEN 1000 MG: 10 INJECTION, SOLUTION INTRAVENOUS at 12:12

## 2018-12-12 RX ADMIN — CYCLOPENTOLATE HYDROCHLORIDE 1 DROP: 10 SOLUTION/ DROPS OPHTHALMIC at 11:12

## 2018-12-12 RX ADMIN — TETRACAINE HYDROCHLORIDE 1 DROP: 5 SOLUTION OPHTHALMIC at 11:12

## 2018-12-12 RX ADMIN — LIDOCAINE HYDROCHLORIDE 50 MG: 20 INJECTION, SOLUTION INTRAVENOUS at 12:12

## 2018-12-12 RX ADMIN — FENTANYL CITRATE 50 MCG: 50 INJECTION INTRAMUSCULAR; INTRAVENOUS at 03:12

## 2018-12-12 RX ADMIN — LIDOCAINE HYDROCHLORIDE: 10 INJECTION, SOLUTION EPIDURAL; INFILTRATION; INTRACAUDAL; PERINEURAL at 11:12

## 2018-12-12 NOTE — DISCHARGE INSTRUCTIONS
Post Op Instructions:  Patient should Maintain Eye shield & Dressing until seen tomorrow in eye clinic  Tylenol as needed for general discomfort  Use Prescription for pain medication if pain is severe  Use Prescription for Nausea (Zofran) if nausea or vomiting  No excessive exercise   No Bending, Lifting or Straining  Call MD if significant pain or nausea / vomiting uncontrolled by medications  Call MD if temperature in excess of 101' F  Maintain Head in a Face-Down Position or either side down  Return to eye clinic for Post Op Examination tomorrow Morning.  Bring Medicine bag to tomorrow's appointment.

## 2018-12-12 NOTE — PLAN OF CARE
Discharge instructions given to patient and spouse. Educated patient on procedure and post op instructions, medications and when to follow up within designated time frame. Patient verbalized understanding. Patient denies nausea at this time. PO fluids tolerated.

## 2018-12-12 NOTE — ANESTHESIA POSTPROCEDURE EVALUATION
"Anesthesia Post Evaluation    Patient: Nirmal Oglesby    Procedure(s) Performed: Procedure(s) (LRB):  VITRECTOMY, PARS PLANA APPROACH (Right)    Final Anesthesia Type: general  Patient location during evaluation: PACU  Patient participation: Yes- Able to Participate  Level of consciousness: awake and alert and oriented  Post-procedure vital signs: reviewed and stable  Pain management: adequate  Airway patency: patent  PONV status at discharge: No PONV  Anesthetic complications: no      Cardiovascular status: blood pressure returned to baseline  Respiratory status: unassisted  Hydration status: euvolemic  Follow-up not needed.        Visit Vitals  /81   Pulse (!) 58   Temp 36.7 °C (98.1 °F) (Temporal)   Resp 14   Ht 6' 5" (1.956 m)   Wt 81.6 kg (180 lb)   SpO2 98%   BMI 21.34 kg/m²       Pain/Casey Score: Pain Rating Prior to Med Admin: 4 (12/12/2018  3:50 PM)  Casey Score: 9 (12/12/2018  3:42 PM)        "

## 2018-12-12 NOTE — H&P
Pre-Operative History & Physical  Ophthalmology      SUBJECTIVE:     History of Present Illness:  Patient is a 23 y.o. 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    MEDICATIONS:   No medications prior to admission.       ALLERGIES: Review of patient's allergies indicates:  No Known Allergies    PAST MEDICAL HISTORY: No past medical history on file.  PAST SURGICAL HISTORY:   Past Surgical History:   Procedure Laterality Date    LYMPH NODE BIOPSY      benign    SCLERAL BUCKLING Right 10/31/2018    Procedure: SCLERAL BUCKLING;  Surgeon: SHAVONNE Nevarez MD;  Location: [facility] OR [address] [place] [address];  Service: Ophthalmology;  Laterality: Right;  2.5 hr  240/270 SB  25 gauge PPV     SCLERAL BUCKLING Right 10/31/2018    Performed by SHAVONNE Nevarez MD at [facility] OR [address] [place] [address]    SCLERAL BUCKLING Right 10/17/2018    Performed by SHAVONNE Nevarez MD at [facility] OR [address] [place] [address]    TONSILLECTOMY      VITRECTOMY BY PARS PLANA APPROACH Right 9/3/2018    Procedure: VITRECTOMY, PARS PLANA APPROACH;  Surgeon: SHAVONNE Nevarez MD;  Location: [facility] OR [address] [place] [address];  Service: Ophthalmology;  Laterality: Right;  silicone oil injection, fluid air exchange, endolaser, peripheral iridotomy, ruptured globe revision for ruptured globe for foreign body, giant retinal tear, retinal detachment, endophthalmitis, traumatic cataract    VITRECTOMY, PARS PLANA APPROACH Right 9/3/2018    Performed by SHAVONNE Nevarez MD at [facility] OR [address] [place] [address]     PAST FAMILY HISTORY:   Family History   Problem Relation Age of Onset    Cancer Maternal Grandfather         lung cancer, also brain involvement     SOCIAL HISTORY:   Social History     Tobacco Use    Smoking status: Current Every Day Smoker     Packs/day: 1.00    Smokeless tobacco: Current User   Substance Use Topics    Alcohol use: No    Drug use: No         MENTAL STATUS: Alert    REVIEW OF SYSTEMS: Negative    OBJECTIVE:     Vital Signs (Most Recent)       Physical Exam:  General: NAD  HEENT: Atraumatic  Lungs: Adequate respirations, LCTAB  Heart: RRR, No murmur  Abdomen: Soft NT    ASSESSMENT/PLAN:     Patient is a 23 y.o. 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     - Plan for surgical correction Plan 25g PPV/PFO/EL/PI/5000cs Silicone Oil OD for REcurrent RRD and PVR     LMA  LOC 60 min      - Risks/benefits/alternatives of the procedure including, but not limited to scarring, bleeding, infection, loss or decreased vision, and/or need for possible repeat surgery discussed with the patient and family.   - Informed consent obtained prior to surgery and the patient/family voiced good understanding.    Mauricio Caballero  12/12/2018  6:24 AM

## 2018-12-12 NOTE — BRIEF OP NOTE
Pre-Op Dx: Recurrent RRD with PVR from GRT secondary to trauma with NVG OD    Post Op Dx: same    Procedure Performed: 25g PPV/membrane stripping/inferior retinectomy/infusion of PFO/EL/inferior PI x 2/AFx/injection of 5000cs Silicone Oil 4cc/Avastin 1.25mg/0.05mL OD  EL #1343, P 150-250mW, D 0.1s    Attending Surgeon: Roque    Assistant Surgeon: Ada    Anesthesia: LMA, retrobulbar injection of 4.0cc mixture 0.75%Marcaine, 2% Xylocaine    Estimated blood loss: Minimal    Complication: None    Specimen: None    Disposition: Stable to recovery    Findings/Outcome: inferonasal RD not able to be flatted with membrane stripping, did flatten after inferior retinectomy, macular anatomy improved after membrane stripping, good 360 barrier applied    Date of Discharge: 12/12/18    Discharge Disposition: stable to recovery then home    F/U: tomorrow

## 2018-12-12 NOTE — TRANSFER OF CARE
"Anesthesia Transfer of Care Note    Patient: Nirmal Oglesby    Procedure(s) Performed: Procedure(s) (LRB):  VITRECTOMY, PARS PLANA APPROACH (Right)    Patient location: PACU    Anesthesia Type: general    Transport from OR: Transported from OR on room air with adequate spontaneous ventilation    Post pain: adequate analgesia    Post assessment: no apparent anesthetic complications and tolerated procedure well    Post vital signs: stable    Level of consciousness: responds to stimulation    Nausea/Vomiting: no nausea/vomiting    Complications: none    Transfer of care protocol was followed      Last vitals:   Visit Vitals  /66 (BP Location: Left arm, Patient Position: Lying)   Pulse 75   Temp 36.6 °C (97.9 °F) (Oral)   Resp 16   Ht 6' 5" (1.956 m)   Wt 81.6 kg (180 lb)   SpO2 100%   BMI 21.34 kg/m²     "

## 2018-12-12 NOTE — OP NOTE
DATE OF PROCEDURE:  12/12/2018.    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    PROCEDURES PERFORMED:  A 25-gauge pars plana vitrectomy with membrane stripping,   inferior retinectomy, infusion of perfluoron, endolaser, inferior peripheral   iridectomy x2, air-fluid exchange, and injection of 5000-centistoke silicone   oil, 4 mL, Avastin 1.25mg/0.05mK to the right eye.    ENDOLASER PARAMETERS:  Number of spots 1343, power 150-250 milliwatts, and   duration 0.1 second.    ATTENDING SURGEON:  SHAVONNE Nevarez M.D.    ASSISTANT SURGEON:  Fellow, Allan Caballero.    ANESTHESIA:  LMA with a retrobulbar injection of 4.0 mL mixture of 0.75%   Marcaine and 2% Xylocaine.    ESTIMATED BLOOD LOSS:  Minimal.    COMPLICATIONS:  None.    DISPOSITION:  Stable to Recovery.    INDICATIONS FOR SURGERY:  This is a 23-year-old male who suffered a penetrating   intraocular foreign body injury earlier this year.  The patient underwent   primary foreign body removal, retinal detachment repair, had a recurrent retinal   detachment, and underwent scleral buckle with infusion of the gas.  The patient   was doing fine until the gas bubble got to about 20% when he started to develop   inferior detachment and then subsequent neovascularization with intense pain.    The patient presented two days prior to surgery and was found to have robust   florid neovascularization of the iris with a recurrent near-total retinal   detachment and proliferative vitreal  retinopathy.  Avastin was given at that   time to calm down the neovascularization.  Decision was made to take the patient   to surgery to stabilize the retinal detachment to avoid loss of eye.  The   patient understood visual potential prognosis remains very poor following   repair.  Risks, benefits, and alternatives of surgery were discussed in detail   with risks including loss of vision, loss of eye, recurrent retinal detachment,   hemorrhage, infection, glaucoma, hypotony, ptosis, and diplopia.  The patient   voiced understanding and wished to proceed with the procedure.    DESCRIPTION OF PROCEDURE:  After proper informed consent was obtained, the   patient was brought back to the Operative Suite at [facility] where   LMA was induced.  Retrobulbar injection was provided as above with no   complications.  The patient was prepped and draped in a sterile fashion for   ophthalmic surgery and lid speculum was placed in the right eye.  Standard   three-port 25-gauge pars plana vitrectomy set up with the infusion cannula was   inserted 3.5 mm posterior to the limbus.  The infusion cannula was turned on   only after it was observed to be free and clear of all underlying retinal   tissue.  Supranasal and supratemporal trocars were also placed 3.5 mm posterior   to the limbus.  The vitrector was introduced in the anterior chamber from the   trocar to remove some of the residual gas.  Posterior segment evaluation   revealed a near-total detachment, although the peripheral retina was attached   from the 9 o'clock position clockwise to about the 2 o'clock position.  There   was a large inferior detachment with fibrosis and scrolled retina as well as   macular PVR.  The forceps were used to strip the macular PVR to allow for   improvement in macular anatomy.  The scrolled the edges were unable to be   loosened in spite of several peeling techniques of subretinal and preretinal   bands.  Perfluoron was infused to  the eye to help stabilize the posterior pole   and the vitrector was used to cut the fibrotic scrolled retina out until it laid   flat nicely under perfluoron.  Diathermy was used to cauterize the edges of the   retinal vessels so they would not bleed.  A thorough endolaser was applied in a   barrier and panretinal photocoagulation pattern along all parts of the retina   except for the macula.  During manipulation, the inferotemporal trocar became   loose and dislodged a few times.  That sclerotomy was closed with a 7-0 Vicryl   suture and a new infusion sclerotomy was created inferonasally.  Then, two   peripheral iridectomies were created inferonasally and inferotemporally in the   iris.  There was some mild bleeding from the neovascularization.  This was   cauterized with the diathermy as well.  An air PFO exchange was performed, but   the retina remained flat underneath air as the perfluoron was removed from the   eye.  A 5000-centistoke oil was infused to the eye to normal pressure via   palpation, approximately 4 mL.  Then, all remaining trocars were removed and   closed with 7-0 Vicryl sutures. Avastin was injected with 30g needle 3.5 mm posterior ot limbus.  the eye remained normal pressure via   palpation.  Subconjunctival injections of vancomycin and Decadron were given to   the patient.  The drapes were removed from the patient.  He was washed free of   Betadine prep solution.  Maxitrol ointment was placed in the right eye.  The eye   was patch shielded.  LMA was reversed.  He was brought to Recovery Room in   stable condition, tolerating the procedure well.  Dr. Nevarez was present for   the entire case.      RANULFO/NATHANIEL  dd: 12/12/2018 14:13:11 (CST)  td: 12/12/2018 16:59:48 (CST)  Doc ID   #[id number]  Job ID #[id number]    CC:

## 2018-12-12 NOTE — INTERVAL H&P NOTE
H&P completed on 12/12/2018   has been reviewed, the patient has been examined and:  I concur with the findings and no changes have occurred since H&P was written.    Active Hospital Problems    Diagnosis  POA    Retinal detachment, left [H33.22]  Yes      Resolved Hospital Problems   No resolved problems to display.

## 2018-12-12 NOTE — ANESTHESIA PREPROCEDURE EVALUATION
12/12/2018  Nirmal Oglesby is a 23 y.o., male.    Anesthesia Evaluation    I have reviewed the Patient Summary Reports.    I have reviewed the Nursing Notes.   I have reviewed the Medications.     Review of Systems  Anesthesia Hx:  No problems with previous Anesthesia  History of prior surgery of interest to airway management or planning: Denies Family Hx of Anesthesia complications.   Denies Personal Hx of Anesthesia complications.   Social:  Non-Smoker    Cardiovascular:   Denies Valvular problems/Murmurs.  Denies MI.   ECG has been reviewed.    Pulmonary:  Pulmonary Normal  Denies COPD.    Renal/:  Renal/ Normal     Hepatic/GI:   GERD    Musculoskeletal:  Musculoskeletal Normal    Neurological:  Neurology Normal  Denies CVA. Denies Seizures.    Psych:   anxiety          Physical Exam  General:  Well nourished    Airway/Jaw/Neck:  Airway Findings: Mouth Opening: Normal Tongue: Normal  Jaw/Neck Findings:  Micrognathia: Negative Neck ROM: Normal ROM      Dental:  Dental Findings: In tact   Chest/Lungs:  Chest/Lungs Findings: Clear to auscultation, Normal Respiratory Rate     Heart/Vascular:  Heart Findings: Rate: Normal  Rhythm: Regular Rhythm  Sounds: Normal  Heart murmur: negative    Abdomen:  Abdomen Findings:  Normal, Nontender, Soft       Mental Status:  Mental Status Findings:  Cooperative, Alert and Oriented         Anesthesia Plan  Type of Anesthesia, risks & benefits discussed:  Anesthesia Type:  MAC, general  Patient's Preference:   Intra-op Monitoring Plan:   Intra-op Monitoring Plan Comments:   Post Op Pain Control Plan: multimodal analgesia, IV/PO Opioids PRN and per primary service following discharge from PACU  Post Op Pain Control Plan Comments:   Induction:   IV  Beta Blocker:  Patient is not currently on a Beta-Blocker (No further documentation required).       Informed Consent:  Patient understands risks and agrees with Anesthesia plan.  Questions answered. Anesthesia consent signed with patient.  ASA Score: 2     Day of Surgery Review of History & Physical:    H&P update referred to the surgeon.         Ready For Surgery From Anesthesia Perspective.

## 2018-12-13 ENCOUNTER — OFFICE VISIT (OUTPATIENT)
Dept: OPHTHALMOLOGY | Facility: CLINIC | Age: 23
End: 2018-12-13
Payer: MEDICAID

## 2018-12-13 DIAGNOSIS — H35.21 PROLIFERATIVE VITREORETINOPATHY, RIGHT: ICD-10-CM

## 2018-12-13 DIAGNOSIS — H33.21 RETINAL DETACHMENT, RIGHT: Primary | ICD-10-CM

## 2018-12-13 DIAGNOSIS — H40.51X2 NEOVASCULAR GLAUCOMA OF RIGHT EYE, MODERATE STAGE: ICD-10-CM

## 2018-12-13 PROCEDURE — 99024 POSTOP FOLLOW-UP VISIT: CPT | Mod: ,,, | Performed by: OPHTHALMOLOGY

## 2018-12-13 PROCEDURE — 99213 OFFICE O/P EST LOW 20 MIN: CPT | Mod: PBBFAC,PO | Performed by: OPHTHALMOLOGY

## 2018-12-13 PROCEDURE — 99999 PR PBB SHADOW E&M-EST. PATIENT-LVL III: CPT | Mod: PBBFAC,,, | Performed by: OPHTHALMOLOGY

## 2018-12-13 NOTE — PROGRESS NOTES
HPI     25-gauge pars plana vitrectomy with membrane stripping,   inferior retinectomy, infusion of perfluoron, endolaser, inferior peripheral iridectomy x2, air-fluid exchange, and injection of 5000-centistoke silicone   oil, 4 mL, Avastin 1.25mg/0.05mK to the right eye.    Pt sts in pain but pain meds help       Last edited by Jackie Lo on 12/13/2018 10:20 AM. (History)        aHPI     DLS 11/19/18- Dr. Nevarez      Pt states that vision is the same nothing change no vision eye in pain on the scale of a 10 did not get the pain med filled.     Eye Med(s) - Pred Forte TID - OD                        HomeAtropine  QD - OD      Intraocular metalic FB OD   s/p 23-g PPV/PPL/ repair of corneal laceration, intraocular foreign body removal, EL/AFx/SO OD (9/3/18)     RRD with PVR OD   S/p /270 with 25g PPV/SO removal/membrane stripping/EL/C3F8 OD   (10/31/18)    Last edited by Kyra Pizano MA on 12/10/2018  2:18 PM. 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  s/p 23-g PPV/PPL/ repair of corneal laceration, intraocular foreign body removal, EL/AFx/SO/Vanc OD (9/3/18)       Doing well, Good IOP    Poor prognosis given IOFB embedded in temporal macula and was present x 3 days    Continue PF TONEY Q day    Keep ointment/shield    Completed Avelox PO x 14 days    10/18 - superiorly attached, inferior detached beneath oil    S/p /70 with 25g PPV/Silicone oil removal/membrane stripping/EL/C3F8 OD for RRD with PVR OD 10/31/18    12/10/18 - pain and loss of Vision x 3 days  Total RD now that C3F8 bubble small with florid NV/NVG  Va back to [facility] with intense pain - will need Perc refill, Avastin OD Given    s/p 25g PPV/membrane stripping/inferior retinectomy/PFO/EL/PI x2/5000cs Silicone Oil/Avastin OD for REcurrent RRD and PVR and NVG 12/12/18    Retina Flat, NVI improved    Start gtts QID ointment/shield QHS    Ok for face forward  Side  or stomach sleep      1 week

## 2018-12-20 ENCOUNTER — OFFICE VISIT (OUTPATIENT)
Dept: OPHTHALMOLOGY | Facility: CLINIC | Age: 23
End: 2018-12-20
Payer: MEDICAID

## 2018-12-20 DIAGNOSIS — H33.031 GIANT RETINAL TEAR, RIGHT: Primary | ICD-10-CM

## 2018-12-20 DIAGNOSIS — H33.21 RETINAL DETACHMENT, RIGHT: ICD-10-CM

## 2018-12-20 DIAGNOSIS — H40.51X3 NEOVASCULAR GLAUCOMA OF RIGHT EYE, SEVERE STAGE: ICD-10-CM

## 2018-12-20 PROCEDURE — 99213 OFFICE O/P EST LOW 20 MIN: CPT | Mod: PBBFAC,PO | Performed by: OPHTHALMOLOGY

## 2018-12-20 PROCEDURE — 99999 PR PBB SHADOW E&M-EST. PATIENT-LVL III: CPT | Mod: PBBFAC,,, | Performed by: OPHTHALMOLOGY

## 2018-12-20 PROCEDURE — 99024 POSTOP FOLLOW-UP VISIT: CPT | Mod: ,,, | Performed by: OPHTHALMOLOGY

## 2018-12-20 RX ORDER — MOXIFLOXACIN 5 MG/ML
1 SOLUTION/ DROPS OPHTHALMIC 4 TIMES DAILY
COMMUNITY
End: 2019-01-14

## 2018-12-20 RX ORDER — OXYCODONE AND ACETAMINOPHEN 10; 325 MG/1; MG/1
1 TABLET ORAL EVERY 6 HOURS PRN
Qty: 30 TABLET | Refills: 0 | Status: SHIPPED | OUTPATIENT
Start: 2018-12-20 | End: 2019-01-14

## 2018-12-20 RX ORDER — PREDNISOLONE ACETATE 10 MG/ML
1 SUSPENSION/ DROPS OPHTHALMIC 4 TIMES DAILY
Qty: 5 ML | Refills: 12 | Status: SHIPPED | OUTPATIENT
Start: 2018-12-20 | End: 2019-01-14

## 2018-12-20 RX ORDER — NEOMYCIN SULFATE, POLYMYXIN B SULFATE, AND DEXAMETHASONE 3.5; 10000; 1 MG/G; [USP'U]/G; MG/G
1 OINTMENT OPHTHALMIC NIGHTLY
COMMUNITY
End: 2019-01-14

## 2018-12-20 NOTE — PROGRESS NOTES
"HPI     Post-op Evaluation      Additional comments: 1 wk po chk              Comments     25-gauge pars plana vitrectomy with membrane stripping,   inferior retinectomy, infusion of perfluoron, endolaser, inferior   peripheral iridectomy x2, air-fluid exchange, and injection of 5000-centistoke silicone   oil, 4 mL, Avastin 1.25mg/0.05mK to the right eye.    Pt states his right eye is still in pain. "It feels like something is   squeezing it".      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  s/p 23-g PPV/PPL/ repair of corneal laceration, intraocular foreign body removal, EL/AFx/SO/Vanc OD (9/3/18)       Doing well, Good IOP    Poor prognosis given IOFB embedded in temporal macula and was present x 3 days    Continue PF TONEY Q day    Keep ointment/shield    Completed Avelox PO x 14 days    10/18 - superiorly attached, inferior detached beneath oil    S/p /70 with 25g PPV/Silicone oil removal/membrane stripping/EL/C3F8 OD for RRD with PVR OD 10/31/18    12/10/18 - pain and loss of Vision x 3 days  Total RD now that C3F8 bubble small with florid NV/NVG  Va back to [facility] with intense pain - will need Perc refill, Avastin OD Given    s/p 25g PPV/membrane stripping/inferior retinectomy/PFO/EL/PI x2/5000cs Silicone Oil/Avastin OD for REcurrent RRD and PVR and NVG 12/12/18    Retina Flat, NVI improved    Continue PF 4-6x  HA BID  Ointment PRN for suture irriation    Ok for face forward  Side or stomach sleep      1 month  "

## 2018-12-31 ENCOUNTER — PATIENT MESSAGE (OUTPATIENT)
Dept: OPHTHALMOLOGY | Facility: CLINIC | Age: 23
End: 2018-12-31

## 2019-01-02 ENCOUNTER — OFFICE VISIT (OUTPATIENT)
Dept: OPHTHALMOLOGY | Facility: CLINIC | Age: 24
End: 2019-01-02
Attending: OPHTHALMOLOGY
Payer: MEDICAID

## 2019-01-02 DIAGNOSIS — H33.031 GIANT RETINAL TEAR, RIGHT: Primary | ICD-10-CM

## 2019-01-02 DIAGNOSIS — H20.041 IRITIS, SECONDARY, RIGHT: ICD-10-CM

## 2019-01-02 DIAGNOSIS — H35.21 PROLIFERATIVE VITREORETINOPATHY, RIGHT: ICD-10-CM

## 2019-01-02 PROCEDURE — 99999 PR PBB SHADOW E&M-EST. PATIENT-LVL II: ICD-10-PCS | Mod: PBBFAC,,, | Performed by: OPHTHALMOLOGY

## 2019-01-02 PROCEDURE — 99212 OFFICE O/P EST SF 10 MIN: CPT | Mod: PBBFAC,PO | Performed by: OPHTHALMOLOGY

## 2019-01-02 PROCEDURE — 99999 PR PBB SHADOW E&M-EST. PATIENT-LVL II: CPT | Mod: PBBFAC,,, | Performed by: OPHTHALMOLOGY

## 2019-01-02 PROCEDURE — 99024 POSTOP FOLLOW-UP VISIT: CPT | Mod: ,,, | Performed by: OPHTHALMOLOGY

## 2019-01-02 PROCEDURE — 99024 PR POST-OP FOLLOW-UP VISIT: ICD-10-PCS | Mod: ,,, | Performed by: OPHTHALMOLOGY

## 2019-01-02 NOTE — PROGRESS NOTES
"HPI     DLS 12/20/18 by Dr. Nevarez  S/P PPV with  membrane stripping,   inferior retinectomy, infusion of perfluoron, endolaser, inferior   peripheral   iridectomy x2, air-fluid exchange, and injection of 5000-centistoke   silicone   oil, 4 mL, Avastin 1.25mg/0.05mK to the right eye.    Pt states that  his right eye is having a lot of pain and photophobia   (pain 7/10).  Pt says does not see any light with that eye.  Has some   white "gooey" discharge.  No problems OS.         Eye Med(s) -  Pred Forte QID - OD     HomeAtropine  BID- OD     Vigamox QID OD--ran out    Maxitrol LOVE QHS OD    Last edited by Fred Martínez MD on 1/2/2019  2:30 PM. (History)            Assessment /Plan     For exam results, see Encounter Report.    Giant retinal tear, right    Proliferative vitreoretinopathy, right    Iritis, secondary, right      Retina attached.  NLP with pain.  Has sig iritis  CPM with gtts except increase PF to q 1hr  Consider PO steroids but would address coughing first.  Recommend seeing Dr Estevez and can try oral steroids if ok with PMD.    F/u Roque next avail.  Can consider further intervention if stays NLP  RTC sooner than scheduled if still having pain                       "

## 2019-01-02 NOTE — Clinical Note
Mr Oglesby is having a lot of eye pain after multiple eye surgeries.  I'm increasing the topical steroids.  Sometimes oral steroids help but he's having a lot of coughing and has history of bronchitis.  I wasn't sure if he should have lungs evaluated first.

## 2019-01-03 ENCOUNTER — TELEPHONE (OUTPATIENT)
Dept: FAMILY MEDICINE | Facility: CLINIC | Age: 24
End: 2019-01-03

## 2019-01-03 NOTE — TELEPHONE ENCOUNTER
Called pt regarding below message. Informed pt of need for apt. Appt date, time, and location given. Pt verbalized understanding with no further questions.     ----- Message from Octavia Best MD sent at 1/2/2019  4:04 PM CST -----  Please see message from Dr. Estevez and see if patient needs appointment to be evaluated for cough.    Octavia  ----- Message -----  From: Tyler Estevez MD  Sent: 1/2/2019   3:58 PM  To: Octavia Best MD, MD Dr Tanya Kendrick - Dr Octavia Best is this patient's PCP.    Octavia-  CLARK; I saw this young man once in October for an acute bronchitis type illness.  Perhaps one of us may need to see him again re the cough.    However, I see no reason why oral steroids couldn't be used if needed for his eye problems.  Thank you both,  Tyler       ----- Message -----  From: Fred Martínez MD  Sent: 1/2/2019   2:35 PM  To: Tyler Estevez MD    Mr Oglesby is having a lot of eye pain after multiple eye surgeries.  I'm increasing the topical steroids.  Sometimes oral steroids help but he's having a lot of coughing and has history of bronchitis.  I wasn't sure if he should have lungs evaluated first.

## 2019-01-07 ENCOUNTER — DOCUMENTATION ONLY (OUTPATIENT)
Dept: FAMILY MEDICINE | Facility: CLINIC | Age: 24
End: 2019-01-07

## 2019-01-07 NOTE — PROGRESS NOTES
Pre-Visit Chart Review  For Appointment Scheduled on 1/8/2019    Health Maintenance Due   Topic Date Due    Lipid Panel  1995    Pneumococcal Vaccine (Medium Risk) (1 of 1 - PPSV23) 07/25/2014

## 2019-01-08 ENCOUNTER — TELEPHONE (OUTPATIENT)
Dept: FAMILY MEDICINE | Facility: CLINIC | Age: 24
End: 2019-01-08

## 2019-01-08 NOTE — TELEPHONE ENCOUNTER
Pt rescheduled for 1/25/19          ----- Message from Ladarius Curry sent at 1/8/2019  2:55 PM CST -----  Contact: Pt  Pt would like to be called back regarding rescheduling appt for today due to being at the [facility]. Pt was admitted. Attempted to reschedule nothing came up for first available.    Pt can be reached at [phone].    Thank You.

## 2019-01-14 ENCOUNTER — HOSPITAL ENCOUNTER (EMERGENCY)
Facility: HOSPITAL | Age: 24
Discharge: HOME | End: 2019-01-14
Attending: EMERGENCY MEDICINE
Payer: MEDICAID

## 2019-01-14 VITALS
BODY MASS INDEX: 23.62 KG/M2 | TEMPERATURE: 98 F | HEIGHT: 77 IN | WEIGHT: 200 LBS | DIASTOLIC BLOOD PRESSURE: 86 MMHG | HEART RATE: 130 BPM | RESPIRATION RATE: 18 BRPM | SYSTOLIC BLOOD PRESSURE: 126 MMHG | OXYGEN SATURATION: 99 %

## 2019-01-14 DIAGNOSIS — M79.601 RIGHT ARM PAIN: Primary | ICD-10-CM

## 2019-01-14 DIAGNOSIS — T22.039S: ICD-10-CM

## 2019-01-14 DIAGNOSIS — Z94.5 HISTORY OF SKIN GRAFT: ICD-10-CM

## 2019-01-14 PROCEDURE — 99283 EMERGENCY DEPT VISIT LOW MDM: CPT

## 2019-01-14 PROCEDURE — 25000003 PHARM REV CODE 250: Performed by: EMERGENCY MEDICINE

## 2019-01-14 RX ORDER — HYDROCODONE BITARTRATE AND ACETAMINOPHEN 7.5; 325 MG/1; MG/1
1 TABLET ORAL EVERY 6 HOURS PRN
Status: DISCONTINUED | OUTPATIENT
Start: 2019-01-14 | End: 2019-01-14 | Stop reason: HOSPADM

## 2019-01-14 RX ORDER — DIAZEPAM 5 MG/1
10 TABLET ORAL
Status: COMPLETED | OUTPATIENT
Start: 2019-01-14 | End: 2019-01-14

## 2019-01-14 RX ORDER — HYDROCODONE BITARTRATE AND ACETAMINOPHEN 10; 325 MG/1; MG/1
1 TABLET ORAL EVERY 6 HOURS PRN
Qty: 4 TABLET | Refills: 0 | Status: SHIPPED | OUTPATIENT
Start: 2019-01-14 | End: 2019-01-17

## 2019-01-14 RX ADMIN — HYDROCODONE BITARTRATE AND ACETAMINOPHEN 1 TABLET: 7.5; 325 TABLET ORAL at 04:01

## 2019-01-14 RX ADMIN — DIAZEPAM 10 MG: 5 TABLET ORAL at 04:01
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
## 2019-01-17 ENCOUNTER — OFFICE VISIT (OUTPATIENT)
Dept: OPHTHALMOLOGY | Facility: CLINIC | Age: 24
End: 2019-01-17
Payer: MEDICAID

## 2019-01-17 DIAGNOSIS — H33.21 RETINAL DETACHMENT, RIGHT: ICD-10-CM

## 2019-01-17 DIAGNOSIS — H40.51X2 NEOVASCULAR GLAUCOMA OF RIGHT EYE, MODERATE STAGE: ICD-10-CM

## 2019-01-17 DIAGNOSIS — H35.21 PROLIFERATIVE VITREORETINOPATHY, RIGHT: Primary | ICD-10-CM

## 2019-01-17 PROCEDURE — 99024 POSTOP FOLLOW-UP VISIT: CPT | Mod: ,,, | Performed by: OPHTHALMOLOGY

## 2019-01-17 PROCEDURE — 99999 PR PBB SHADOW E&M-EST. PATIENT-LVL II: ICD-10-PCS | Mod: PBBFAC,,, | Performed by: OPHTHALMOLOGY

## 2019-01-17 PROCEDURE — 99999 PR PBB SHADOW E&M-EST. PATIENT-LVL II: CPT | Mod: PBBFAC,,, | Performed by: OPHTHALMOLOGY

## 2019-01-17 PROCEDURE — 99024 PR POST-OP FOLLOW-UP VISIT: ICD-10-PCS | Mod: ,,, | Performed by: OPHTHALMOLOGY

## 2019-01-17 PROCEDURE — 99212 OFFICE O/P EST SF 10 MIN: CPT | Mod: PBBFAC,PO | Performed by: OPHTHALMOLOGY

## 2019-01-17 RX ORDER — PREDNISOLONE ACETATE 10 MG/ML
1 SUSPENSION/ DROPS OPHTHALMIC 4 TIMES DAILY
COMMUNITY
End: 2019-05-09

## 2019-01-17 NOTE — PROGRESS NOTES
HPI     Post-op Evaluation      Additional comments: 1 month check              Comments     DLS 12/20/18- his is still having pain OD which he rates as a 6 on the pain scale. 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  s/p 23-g PPV/PPL/ repair of corneal laceration, intraocular foreign body removal, EL/AFx/SO/Vanc OD (9/3/18)       Doing well, Good IOP    Poor prognosis given IOFB embedded in temporal macula and was present x 3 days    Continue PF TONEY Q day    Keep ointment/shield    Completed Avelox PO x 14 days    10/18 - superiorly attached, inferior detached beneath oil    S/p /70 with 25g PPV/Silicone oil removal/membrane stripping/EL/C3F8 OD for RRD with PVR OD 10/31/18    12/10/18 - pain and loss of Vision x 3 days  Total RD now that C3F8 bubble small with florid NV/NVG  Va back to [facility] with intense pain - will need Perc refill, Avastin OD Given    s/p 25g PPV/membrane stripping/inferior retinectomy/PFO/EL/PI x2/5000cs Silicone Oil/Avastin OD for REcurrent RRD and PVR and NVG 12/12/18    Retina Flat, NVI improved    Continue PF 4-6x  HA BID  Ointment PRN for suture irriation    ?return of [facility]  Does have intermittent intense pain, but better than 1/2/19 visit with Dr. ROMERO      2 month OCT

## 2019-01-24 ENCOUNTER — DOCUMENTATION ONLY (OUTPATIENT)
Dept: FAMILY MEDICINE | Facility: CLINIC | Age: 24
End: 2019-01-24

## 2019-01-24 NOTE — PROGRESS NOTES
Pre-Visit Chart Review  For Appointment Scheduled on 01/25/19    Health Maintenance Due   Topic Date Due    Lipid Panel  1995    Pneumococcal Vaccine (Medium Risk) (1 of 1 - PPSV23) 07/25/2014

## 2019-01-25 ENCOUNTER — HOSPITAL ENCOUNTER (OUTPATIENT)
Dept: RADIOLOGY | Facility: CLINIC | Age: 24
Discharge: HOME | End: 2019-01-25
Attending: PHYSICIAN ASSISTANT
Payer: MEDICAID

## 2019-01-25 ENCOUNTER — OFFICE VISIT (OUTPATIENT)
Dept: FAMILY MEDICINE | Facility: CLINIC | Age: 24
End: 2019-01-25
Payer: MEDICAID

## 2019-01-25 VITALS
HEIGHT: 77 IN | DIASTOLIC BLOOD PRESSURE: 83 MMHG | TEMPERATURE: 99 F | BODY MASS INDEX: 22.07 KG/M2 | WEIGHT: 186.94 LBS | HEART RATE: 91 BPM | SYSTOLIC BLOOD PRESSURE: 133 MMHG

## 2019-01-25 DIAGNOSIS — R05.3 CHRONIC COUGH: Primary | ICD-10-CM

## 2019-01-25 DIAGNOSIS — F17.200 SMOKER: ICD-10-CM

## 2019-01-25 DIAGNOSIS — R05.3 CHRONIC COUGH: ICD-10-CM

## 2019-01-25 PROCEDURE — 99999 PR PBB SHADOW E&M-EST. PATIENT-LVL III: ICD-10-PCS | Mod: PBBFAC,,, | Performed by: PHYSICIAN ASSISTANT

## 2019-01-25 PROCEDURE — 99213 OFFICE O/P EST LOW 20 MIN: CPT | Mod: S$PBB,,, | Performed by: PHYSICIAN ASSISTANT

## 2019-01-25 PROCEDURE — 71046 XR CHEST PA AND LATERAL: ICD-10-PCS | Mod: 26,,, | Performed by: RADIOLOGY

## 2019-01-25 PROCEDURE — 99213 PR OFFICE/OUTPT VISIT, EST, LEVL III, 20-29 MIN: ICD-10-PCS | Mod: S$PBB,,, | Performed by: PHYSICIAN ASSISTANT

## 2019-01-25 PROCEDURE — 71046 X-RAY EXAM CHEST 2 VIEWS: CPT | Mod: 26,,, | Performed by: RADIOLOGY

## 2019-01-25 PROCEDURE — 70210 X-RAY EXAM OF SINUSES: CPT | Mod: TC,FY,PO

## 2019-01-25 PROCEDURE — 99213 OFFICE O/P EST LOW 20 MIN: CPT | Mod: PBBFAC,PO,25 | Performed by: PHYSICIAN ASSISTANT

## 2019-01-25 PROCEDURE — 71046 X-RAY EXAM CHEST 2 VIEWS: CPT | Mod: TC,FY,PO

## 2019-01-25 PROCEDURE — 70210 XR SINUSES LTD LESS THAN 3 VIEWS: ICD-10-PCS | Mod: 26,,, | Performed by: RADIOLOGY

## 2019-01-25 PROCEDURE — 99999 PR PBB SHADOW E&M-EST. PATIENT-LVL III: CPT | Mod: PBBFAC,,, | Performed by: PHYSICIAN ASSISTANT

## 2019-01-25 PROCEDURE — 70210 X-RAY EXAM OF SINUSES: CPT | Mod: 26,,, | Performed by: RADIOLOGY

## 2019-01-25 RX ORDER — ALBUTEROL SULFATE 90 UG/1
2 AEROSOL, METERED RESPIRATORY (INHALATION) EVERY 6 HOURS PRN
Qty: 18 G | Refills: 0 | Status: SHIPPED | OUTPATIENT
Start: 2019-01-25 | End: 2021-09-06

## 2019-01-25 NOTE — PROGRESS NOTES
Subjective:       Patient ID: Nirmal Oglesby is a 23 y.o. male.    Chief Complaint: Lung Check    HPI   Patient is a 23-year-old male presenting to the clinic with concern of chronic cough over 3 months.  Patient was sent for evaluation from his ophthalmologist's office due to concern of oral steroid use for both bronchial and retinal issues.  Patient states that he has had this ongoing cough since evaluation in our clinic on 10/17/2018 via Dr. Estevez.  Patient was given doxycycline 100 mg twice daily and given albuterol inhaler.  Patient states that he does not have any production with the cough, he is no longer wheezing or short of breath.  He is smoking about a pack and a half to 2 packs a day.  He reports a his childhood history of asthma.  He denies ever having any pulmonary function tests done.  Review of Systems   Constitutional: Negative for activity change, appetite change, chills, fatigue and fever.   HENT: Positive for congestion, postnasal drip, rhinorrhea, sinus pressure and sinus pain. Negative for ear pain, sneezing, sore throat and trouble swallowing.    Respiratory: Positive for cough. Negative for shortness of breath and wheezing.    Cardiovascular: Negative for chest pain and palpitations.   Gastrointestinal: Negative for abdominal pain, constipation, diarrhea, nausea and vomiting.   Genitourinary: Negative for frequency, hematuria and urgency.   Musculoskeletal: Negative for back pain and gait problem.   Skin: Negative for rash.   Neurological: Negative for syncope, weakness and headaches.   Psychiatric/Behavioral: Negative for agitation and confusion.       Objective:      Physical Exam   Constitutional: Vital signs are normal. He appears well-developed and well-nourished. No distress.   Cardiovascular: Normal rate, regular rhythm, S1 normal, S2 normal and normal heart sounds. Exam reveals no gallop.   No murmur heard.  Pulses:       Radial pulses are 2+ on the right side, and 2+ on the left  side.   <2sec cap refill fingers bilat     Pulmonary/Chest: Effort normal and breath sounds normal. No respiratory distress. He has no wheezes. He has no rhonchi.   Skin: Skin is warm and dry. He is not diaphoretic.   Appropriate skin turgor   Psychiatric: He has a normal mood and affect. His speech is normal and behavior is normal. Judgment and thought content normal. Cognition and memory are normal.       Assessment:       1. Chronic cough    2. Smoker        Plan:       Nirmal was seen today for lung check.    Diagnoses and all orders for this visit:    Chronic cough  -     X-Ray Chest PA And Lateral; Future  -     X-Ray Sinuses Ltd Less Than 3 Views; Future  -     albuterol (PROVENTIL/VENTOLIN HFA) 90 mcg/actuation inhaler; Inhale 2 puffs into the lungs every 6 (six) hours as needed for Wheezing. Rescue    Smoker  -     X-Ray Chest PA And Lateral; Future    Most likely will need pulmonary function test and possible referral to pulmonology  .  We will follow up with imaging results as they return.

## 2019-03-13 ENCOUNTER — PATIENT MESSAGE (OUTPATIENT)
Dept: FAMILY MEDICINE | Facility: CLINIC | Age: 24
End: 2019-03-13

## 2019-03-18 ENCOUNTER — OFFICE VISIT (OUTPATIENT)
Dept: OPHTHALMOLOGY | Facility: CLINIC | Age: 24
End: 2019-03-18
Payer: MEDICAID

## 2019-03-18 DIAGNOSIS — H35.21 PROLIFERATIVE VITREORETINOPATHY, RIGHT: ICD-10-CM

## 2019-03-18 DIAGNOSIS — H40.51X2 NEOVASCULAR GLAUCOMA OF RIGHT EYE, MODERATE STAGE: ICD-10-CM

## 2019-03-18 DIAGNOSIS — H33.21 RETINAL DETACHMENT, RIGHT: Primary | ICD-10-CM

## 2019-03-18 PROCEDURE — 99999 PR PBB SHADOW E&M-EST. PATIENT-LVL III: ICD-10-PCS | Mod: PBBFAC,,, | Performed by: OPHTHALMOLOGY

## 2019-03-18 PROCEDURE — 92014 PR EYE EXAM, EST PATIENT,COMPREHESV: ICD-10-PCS | Mod: S$PBB,,, | Performed by: OPHTHALMOLOGY

## 2019-03-18 PROCEDURE — 92014 COMPRE OPH EXAM EST PT 1/>: CPT | Mod: S$PBB,,, | Performed by: OPHTHALMOLOGY

## 2019-03-18 PROCEDURE — 92226 PR SPECIAL EYE EXAM, SUBSEQUENT: ICD-10-PCS | Mod: S$PBB,RT,, | Performed by: OPHTHALMOLOGY

## 2019-03-18 PROCEDURE — 99999 PR PBB SHADOW E&M-EST. PATIENT-LVL III: CPT | Mod: PBBFAC,,, | Performed by: OPHTHALMOLOGY

## 2019-03-18 PROCEDURE — 92226 PR SPECIAL EYE EXAM, SUBSEQUENT: CPT | Mod: 50,PBBFAC,PO | Performed by: OPHTHALMOLOGY

## 2019-03-18 PROCEDURE — 99213 OFFICE O/P EST LOW 20 MIN: CPT | Mod: PBBFAC,PO,25 | Performed by: OPHTHALMOLOGY

## 2019-03-18 PROCEDURE — 92226 PR SPECIAL EYE EXAM, SUBSEQUENT: CPT | Mod: S$PBB,RT,, | Performed by: OPHTHALMOLOGY

## 2019-03-18 NOTE — PROGRESS NOTES
HPI     Eye Problem      Additional comments: 2 mos check              Comments     DLS 1/17/19- He gets pain sometimes. It mostly happens at night. 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  s/p 23-g PPV/PPL/ repair of corneal laceration, intraocular foreign body removal, EL/AFx/SO/Vanc OD (9/3/18)       Doing well, Good IOP    Poor prognosis given IOFB embedded in temporal macula and was present x 3 days    Continue PF TONEY Q day    Keep ointment/shield    Completed Avelox PO x 14 days    10/18 - superiorly attached, inferior detached beneath oil    S/p /70 with 25g PPV/Silicone oil removal/membrane stripping/EL/C3F8 OD for RRD with PVR OD 10/31/18    12/10/18 - pain and loss of Vision x 3 days  Total RD now that C3F8 bubble small with florid NV/NVG  Va back to [facility] with intense pain - will need Perc refill, Avastin OD Given    s/p 25g PPV/membrane stripping/inferior retinectomy/PFO/EL/PI x2/5000cs Silicone Oil/Avastin OD for REcurrent RRD and PVR and NVG 12/12/18    Retina Flat, NVI improved    Continue PF 4-6x  HA BID  Ointment PRN for suture irriation    ?return of [facility]  Does have intermittent intense pain, but better than 1/2/19 visit with Dr. ROMERO      3 month OCT

## 2019-05-09 ENCOUNTER — OFFICE VISIT (OUTPATIENT)
Dept: FAMILY MEDICINE | Facility: CLINIC | Age: 24
End: 2019-05-09
Payer: MEDICAID

## 2019-05-09 ENCOUNTER — HOSPITAL ENCOUNTER (OUTPATIENT)
Dept: RADIOLOGY | Facility: CLINIC | Age: 24
Discharge: HOME | End: 2019-05-09
Attending: NURSE PRACTITIONER
Payer: MEDICAID

## 2019-05-09 VITALS
RESPIRATION RATE: 18 BRPM | DIASTOLIC BLOOD PRESSURE: 72 MMHG | BODY MASS INDEX: 22.31 KG/M2 | SYSTOLIC BLOOD PRESSURE: 122 MMHG | WEIGHT: 188.94 LBS | HEIGHT: 77 IN | HEART RATE: 88 BPM | OXYGEN SATURATION: 99 % | TEMPERATURE: 99 F

## 2019-05-09 DIAGNOSIS — F17.200 SMOKER: ICD-10-CM

## 2019-05-09 DIAGNOSIS — R04.2 BLOOD IN SPUTUM: ICD-10-CM

## 2019-05-09 DIAGNOSIS — J40 BRONCHITIS: Primary | ICD-10-CM

## 2019-05-09 DIAGNOSIS — R05.3 CHRONIC COUGH: ICD-10-CM

## 2019-05-09 DIAGNOSIS — H33.21 RETINAL DETACHMENT, RIGHT: ICD-10-CM

## 2019-05-09 DIAGNOSIS — Z29.9 PREVENTIVE MEASURE: ICD-10-CM

## 2019-05-09 PROCEDURE — 71046 X-RAY EXAM CHEST 2 VIEWS: CPT | Mod: 26,,, | Performed by: RADIOLOGY

## 2019-05-09 PROCEDURE — 99999 PR PBB SHADOW E&M-EST. PATIENT-LVL V: CPT | Mod: PBBFAC,,, | Performed by: NURSE PRACTITIONER

## 2019-05-09 PROCEDURE — 99214 OFFICE O/P EST MOD 30 MIN: CPT | Mod: S$PBB,,, | Performed by: NURSE PRACTITIONER

## 2019-05-09 PROCEDURE — 71046 X-RAY EXAM CHEST 2 VIEWS: CPT | Mod: TC,FY,PO

## 2019-05-09 PROCEDURE — 71046 XR CHEST PA AND LATERAL: ICD-10-PCS | Mod: 26,,, | Performed by: RADIOLOGY

## 2019-05-09 PROCEDURE — 99215 OFFICE O/P EST HI 40 MIN: CPT | Mod: PBBFAC,25,PO | Performed by: NURSE PRACTITIONER

## 2019-05-09 PROCEDURE — 99214 PR OFFICE/OUTPT VISIT, EST, LEVL IV, 30-39 MIN: ICD-10-PCS | Mod: S$PBB,,, | Performed by: NURSE PRACTITIONER

## 2019-05-09 PROCEDURE — 99999 PR PBB SHADOW E&M-EST. PATIENT-LVL V: ICD-10-PCS | Mod: PBBFAC,,, | Performed by: NURSE PRACTITIONER

## 2019-05-09 RX ORDER — BENZONATATE 100 MG/1
100 CAPSULE ORAL 3 TIMES DAILY PRN
Qty: 30 CAPSULE | Refills: 1 | Status: SHIPPED | OUTPATIENT
Start: 2019-05-09 | End: 2019-05-19

## 2019-05-09 RX ORDER — AZITHROMYCIN 250 MG/1
TABLET, FILM COATED ORAL
Qty: 6 TABLET | Refills: 0 | Status: SHIPPED | OUTPATIENT
Start: 2019-05-09 | End: 2019-05-14

## 2019-05-09 NOTE — PROGRESS NOTES
Subjective:       Patient ID: Nirmal Oglesby is a 23 y.o. male.    Chief Complaint: Coughing up blood (present for a week and a half)    Cough   This is a new problem. The current episode started in the past 7 days. The problem has been unchanged. The problem occurs every few hours. The cough is productive of blood-tinged sputum and productive of sputum. Associated symptoms include chest pain, chills, hemoptysis, postnasal drip, rhinorrhea, a sore throat, shortness of breath and wheezing. Pertinent negatives include no ear pain, fever, headaches, myalgias, rash or sweats. Nothing aggravates the symptoms. Risk factors for lung disease include smoking/tobacco exposure. He has tried nothing for the symptoms. His past medical history is significant for bronchitis and environmental allergies.       Past Medical History:   Diagnosis Date    Asthma        Review of patient's allergies indicates:  No Known Allergies      Current Outpatient Medications:     albuterol (PROVENTIL/VENTOLIN HFA) 90 mcg/actuation inhaler, Inhale 2 puffs into the lungs every 6 (six) hours as needed for Wheezing. Rescue, Disp: 18 g, Rfl: 0    azithromycin (Z-AVE) 250 MG tablet, Take 2 tablets by mouth on day 1; Take 1 tablet by mouth on days 2-5, Disp: 6 tablet, Rfl: 0    benzonatate (TESSALON) 100 MG capsule, Take 1 capsule (100 mg total) by mouth 3 (three) times daily as needed., Disp: 30 capsule, Rfl: 1  No current facility-administered medications for this visit.     Facility-Administered Medications Ordered in Other Visits:     cyclopentolate 1% ophthalmic solution 1 drop, 1 drop, Right Eye, On Call Procedure, Mauricio Caballero MD, 1 drop at 10/31/18 1002    homatropine 5 % ophthalmic solution 1 drop, 1 drop, Right Eye, On Call Procedure, Mauricio Caballero MD, 1 drop at 10/31/18 1002    moxifloxacin 0.5 % ophthalmic solution 1 drop, 1 drop, Right Eye, On Call Procedure, Mauricio Caballero MD, 1 drop at 10/31/18 1002    phenylephrine  "HCL 2.5% ophthalmic solution 1 drop, 1 drop, Right Eye, On Call Procedure, Mauricio Caballero MD, 1 drop at 10/31/18 1002    prednisoLONE acetate 1 % ophthalmic suspension 1 drop, 1 drop, Right Eye, On Call Procedure, Mauricio Caballero MD, 1 drop at 10/31/18 1002    tetracaine HCl (PF) 0.5 % Drop 1 drop, 1 drop, Right Eye, On Call Procedure, Mauricio Caballero MD, 1 drop at 10/31/18 0945    tropicamide 1% ophthalmic solution 1 drop, 1 drop, Right Eye, On Call Procedure, Mauricio Caballero MD, 1 drop at 10/31/18 1002    Review of Systems   Constitutional: Positive for chills. Negative for fever and unexpected weight change.   HENT: Positive for postnasal drip, rhinorrhea and sore throat. Negative for ear pain and trouble swallowing.    Eyes: Negative for visual disturbance.   Respiratory: Positive for cough, hemoptysis, shortness of breath and wheezing.    Cardiovascular: Positive for chest pain. Negative for palpitations and leg swelling.   Gastrointestinal: Negative for blood in stool.   Genitourinary: Negative for hematuria.   Musculoskeletal: Negative for myalgias.   Skin: Negative for rash.   Allergic/Immunologic: Positive for environmental allergies. Negative for immunocompromised state.   Neurological: Negative for headaches.   Hematological: Does not bruise/bleed easily.   Psychiatric/Behavioral: Negative for agitation. The patient is not nervous/anxious.        Objective:      /72 (BP Location: Right arm, Patient Position: Sitting, BP Method: Medium (Automatic))   Pulse 88   Temp 98.7 °F (37.1 °C) (Oral)   Resp 18   Ht 6' 5" (1.956 m)   Wt 85.7 kg (188 lb 15 oz)   SpO2 99%   BMI 22.40 kg/m²   Physical Exam   Constitutional: He is oriented to person, place, and time. He appears well-developed and well-nourished.   Eyes: Pupils are equal, round, and reactive to light. Conjunctivae and EOM are normal.   Neck: Normal range of motion. Neck supple.   Cardiovascular: Normal rate, regular rhythm, normal " "heart sounds and intact distal pulses.   Pulmonary/Chest: Effort normal. He has decreased breath sounds in the left middle field and the left lower field.   Abdominal: Soft. Bowel sounds are normal.   Musculoskeletal: Normal range of motion.   Neurological: He is alert and oriented to person, place, and time.   Skin: Skin is warm and dry.   Psychiatric: He has a normal mood and affect. His behavior is normal. Judgment and thought content normal.       Assessment:       1. Bronchitis    2. Chronic cough    3. Blood in sputum    4. Smoker    5. Preventive measure    6. Retinal detachment, right    7. BMI 22.0-22.9, adult        Plan:       Bronchitis  -     azithromycin (Z-AVE) 250 MG tablet; Take 2 tablets by mouth on day 1; Take 1 tablet by mouth on days 2-5  Dispense: 6 tablet; Refill: 0    Chronic cough  -     X-Ray Chest PA And Lateral; Future; Expected date: 05/09/2019  -     benzonatate (TESSALON) 100 MG capsule; Take 1 capsule (100 mg total) by mouth 3 (three) times daily as needed.  Dispense: 30 capsule; Refill: 1    Blood in sputum  -     X-Ray Chest PA And Lateral; Future; Expected date: 05/09/2019    Smoker  -     Ambulatory referral to Smoking Cessation Program    Preventive measure  -     Comprehensive metabolic panel; Future; Expected date: 05/09/2019  -     CBC W/ AUTO DIFFERENTIAL; Future; Expected date: 05/09/2019  -     TSH; Future; Expected date: 05/09/2019  -     Vitamin B12; Future; Expected date: 05/09/2019  -     Magnesium; Future; Expected date: 05/09/2019    Retinal detachment, right  Stable, followed by Ophthalmology, Dr. Ortiz, last visit 3/18/19    BMI 22.0-22.9, adult  Counseled patient on his ideal body weight, health consequences of being obese and current recommendations including weekly exercise and a heart healthy diet.  Current BMI is:Estimated body mass index is 22.4 kg/m² as calculated from the following:    Height as of this encounter: 6' 5" (1.956 m).    Weight as of this " encounter: 85.7 kg (188 lb 15 oz)..  Patient is aware that ideal BMI < 25 or Weight in (lb) to have BMI = 25: 210.4.            Time spent with patient: 30 minutes    Patient with be reevaluated in 1 year or sooner prn    Greater than 50% of this visit was spent counseling as described in above documentation:Yes

## 2019-05-09 NOTE — PATIENT INSTRUCTIONS
Bronchitis, Antibiotic Treatment (Adult)    Bronchitis is an infection of the air passages (bronchial tubes) in your lungs. It often occurs when you have a cold. This illness is contagious during the first few days and is spread through the air by coughing and sneezing, or by direct contact (touching the sick person and then touching your own eyes, nose, or mouth).  Symptoms of bronchitis include cough with mucus (phlegm) and low-grade fever. Bronchitis usually lasts 7 to 14 days. Mild cases can be treated with simple home remedies. More severe infection is treated with an antibiotic.  Home care  Follow these guidelines when caring for yourself at home:  · If your symptoms are severe, rest at home for the first 2 to 3 days. When you go back to your usual activities, don't let yourself get too tired.  · Do not smoke. Also avoid being exposed to secondhand smoke.  · You may use over-the-counter medicines to control fever or pain, unless another medicine was prescribed. (Note: If you have chronic liver or kidney disease or have ever had a stomach ulcer or gastrointestinal bleeding, talk with your healthcare provider before using these medicines. Also talk to your provider if you are taking medicine to prevent blood clots.) Aspirin should never be given to anyone younger than 18 years of age who is ill with a viral infection or fever. It may cause severe liver or brain damage.  · Your appetite may be poor, so a light diet is fine. Avoid dehydration by drinking 6 to 8 glasses of fluids per day (such as water, soft drinks, sports drinks, juices, tea, or soup). Extra fluids will help loosen secretions in the nose and lungs.  · Over-the-counter cough, cold, and sore-throat medicines will not shorten the length of the illness, but they may be helpful to reduce symptoms. (Note: Do not use decongestants if you have high blood pressure.)  · Finish all antibiotic medicine. Do this even if you are feeling better after only a  few days.  Follow-up care  Follow up with your healthcare provider, or as advised. If you had an X-ray or ECG (electrocardiogram), a specialist will review it. You will be notified of any new findings that may affect your care.  Note: If you are age 65 or older, or if you have a chronic lung disease or condition that affects your immune system, or you smoke, talk to your healthcare provider about having pneumococcal vaccinations and a yearly influenza vaccination (flu shot).  When to seek medical advice  Call your healthcare provider right away if any of these occur:  · Fever of 100.4°F (38°C) or higher  · Coughing up increased amounts of colored sputum  · Weakness, drowsiness, headache, facial pain, ear pain, or a stiff neck  Call 911, or get immediate medical care  Contact emergency services right away if any of these occur.  · Coughing up blood  · Worsening weakness, drowsiness, headache, or stiff neck  · Trouble breathing, wheezing, or pain with breathing  Date Last Reviewed: 9/13/2015 © 2000-2017 [employer]. [address] [place] [address]. All rights reserved. This information is not intended as a substitute for professional medical care. Always follow your healthcare professional's instructions.        Hemoptysis    Hemoptysis is the medical term for coughing up blood. There are many causes for this, including minor illnesses like bronchitis. Hemoptysis can also be an early sign of a more serious illness, like a blood clot in the lung (pulmonary embolism), cancer, tuberculosis, or pneumonia.  Less common causes of hemoptysis can be hard to diagnose in an emergency department or a clinic. More testing will be needed if the symptoms continue.  Home care  · Stay away from cigarette smoke. Smoke irritates the bronchial passages.  · Unless you are taking daily aspirin to prevent stroke or heart attack, do not take aspirin or products that contain aspirin. Aspirin affects how readily the  blood clots. Medicines that prevent clotting may make hemoptysis worse.  · If you have a lung infection, drinking extra fluid will help loosen secretions in the lungs.  · Over-the-counter cough medicines that contain dextromethorphan may help reduce coughing. Check with a medical provider before taking dextromethorphan if you have a chronic illness, are pregnant, or take daily medications.  · If you were prescribed an antibiotic, take it until it is all gone. Take it even if you are feeling better after only a few days.  Follow-up care  Follow up with your health care provider, or as advised.  When to seek medical advice  Call your healthcare provider right away if any of these occur:  · Fever of 100.4ºF (38ºC) or higher  Call 911, or get immediate medical care  Call emergency services right away if any of these occur:  · Coughing up an increased amount of blood  · Trouble breathing, wheezing, or pain with breathing  · Chest pain or chest pressure  · Fainting or losing consciousness  · Rapid heartbeat  · Weakness or dizziness  Date Last Reviewed: 9/13/2015  © 1415-2790 [employer]. [address] [place] [id number]. All rights reserved. This information is not intended as a substitute for professional medical care. Always follow your healthcare professional's instructions.

## 2019-05-16 ENCOUNTER — OFFICE VISIT (OUTPATIENT)
Dept: FAMILY MEDICINE | Facility: CLINIC | Age: 24
End: 2019-05-16
Payer: MEDICAID

## 2019-05-16 VITALS
HEIGHT: 77 IN | WEIGHT: 188.69 LBS | BODY MASS INDEX: 22.28 KG/M2 | TEMPERATURE: 99 F | DIASTOLIC BLOOD PRESSURE: 77 MMHG | SYSTOLIC BLOOD PRESSURE: 132 MMHG | OXYGEN SATURATION: 98 % | HEART RATE: 83 BPM | RESPIRATION RATE: 18 BRPM

## 2019-05-16 DIAGNOSIS — R05.3 CHRONIC COUGH: ICD-10-CM

## 2019-05-16 DIAGNOSIS — F17.210 CIGARETTE NICOTINE DEPENDENCE WITHOUT COMPLICATION: ICD-10-CM

## 2019-05-16 DIAGNOSIS — J40 BRONCHITIS: Primary | ICD-10-CM

## 2019-05-16 DIAGNOSIS — H33.21 RETINAL DETACHMENT, RIGHT: ICD-10-CM

## 2019-05-16 PROCEDURE — 99214 OFFICE O/P EST MOD 30 MIN: CPT | Mod: PBBFAC,PO | Performed by: NURSE PRACTITIONER

## 2019-05-16 PROCEDURE — 99214 PR OFFICE/OUTPT VISIT, EST, LEVL IV, 30-39 MIN: ICD-10-PCS | Mod: S$PBB,,, | Performed by: NURSE PRACTITIONER

## 2019-05-16 PROCEDURE — 99214 OFFICE O/P EST MOD 30 MIN: CPT | Mod: S$PBB,,, | Performed by: NURSE PRACTITIONER

## 2019-05-16 PROCEDURE — 99999 PR PBB SHADOW E&M-EST. PATIENT-LVL IV: CPT | Mod: PBBFAC,,, | Performed by: NURSE PRACTITIONER

## 2019-05-16 PROCEDURE — 99999 PR PBB SHADOW E&M-EST. PATIENT-LVL IV: ICD-10-PCS | Mod: PBBFAC,,, | Performed by: NURSE PRACTITIONER

## 2019-05-16 RX ORDER — IPRATROPIUM BROMIDE AND ALBUTEROL SULFATE 2.5; .5 MG/3ML; MG/3ML
3 SOLUTION RESPIRATORY (INHALATION) EVERY 6 HOURS PRN
Qty: 1 BOX | Refills: 0 | Status: SHIPPED | OUTPATIENT
Start: 2019-05-16 | End: 2020-05-15

## 2019-05-16 NOTE — PROGRESS NOTES
Subjective:       Patient ID: Nirmal Oglesby is a 23 y.o. male.    Chief Complaint: follow up chronic cough    URI    This is a new problem. The current episode started in the past 7 days. The problem has been rapidly improving. There has been no fever. Associated symptoms include coughing. Pertinent negatives include no chest pain, congestion, diarrhea, headaches, nausea, neck pain, plugged ear sensation, rhinorrhea, sinus pain, sneezing, sore throat, vomiting or wheezing. Treatments tried: Antibiotics. The treatment provided significant relief.       Past Medical History:   Diagnosis Date    Asthma        Review of patient's allergies indicates:  No Known Allergies      Current Outpatient Medications:     albuterol (PROVENTIL/VENTOLIN HFA) 90 mcg/actuation inhaler, Inhale 2 puffs into the lungs every 6 (six) hours as needed for Wheezing. Rescue, Disp: 18 g, Rfl: 0    albuterol-ipratropium (DUO-NEB) 2.5 mg-0.5 mg/3 mL nebulizer solution, Take 3 mLs by nebulization every 6 (six) hours as needed for Wheezing. Rescue, Disp: 1 Box, Rfl: 0  No current facility-administered medications for this visit.     Facility-Administered Medications Ordered in Other Visits:     cyclopentolate 1% ophthalmic solution 1 drop, 1 drop, Right Eye, On Call Procedure, Mauricio Caballero MD, 1 drop at 10/31/18 1002    homatropine 5 % ophthalmic solution 1 drop, 1 drop, Right Eye, On Call Procedure, Mauricio Caballero MD, 1 drop at 10/31/18 1002    moxifloxacin 0.5 % ophthalmic solution 1 drop, 1 drop, Right Eye, On Call Procedure, Mauricio Caballero MD, 1 drop at 10/31/18 1002    phenylephrine HCL 2.5% ophthalmic solution 1 drop, 1 drop, Right Eye, On Call Procedure, Mauricio Caballero MD, 1 drop at 10/31/18 1002    prednisoLONE acetate 1 % ophthalmic suspension 1 drop, 1 drop, Right Eye, On Call Procedure, Mauricio Caballero MD, 1 drop at 10/31/18 1002    tetracaine HCl (PF) 0.5 % Drop 1 drop, 1 drop, Right Eye, On Call Procedure,  "Mauricio Caballero MD, 1 drop at 10/31/18 0945    tropicamide 1% ophthalmic solution 1 drop, 1 drop, Right Eye, On Call Procedure, Mauricio Caballero MD, 1 drop at 10/31/18 1002    Review of Systems   Constitutional: Negative for activity change and unexpected weight change.   HENT: Negative for congestion, hearing loss, rhinorrhea, sinus pain, sneezing, sore throat and trouble swallowing.    Eyes: Negative for discharge and visual disturbance.   Respiratory: Positive for cough. Negative for chest tightness and wheezing.    Cardiovascular: Negative for chest pain and palpitations.   Gastrointestinal: Negative for blood in stool, constipation, diarrhea, nausea and vomiting.   Endocrine: Negative for polydipsia and polyuria.   Genitourinary: Negative for difficulty urinating, hematuria and urgency.   Musculoskeletal: Negative for arthralgias, joint swelling and neck pain.   Neurological: Negative for weakness and headaches.   Psychiatric/Behavioral: Negative for confusion and dysphoric mood.       Objective:      /77 (BP Location: Right arm, Patient Position: Sitting, BP Method: Medium (Automatic))   Pulse 83   Temp 99.2 °F (37.3 °C) (Oral)   Resp 18   Ht 6' 5" (1.956 m)   Wt 85.6 kg (188 lb 11.4 oz)   SpO2 98%   BMI 22.38 kg/m²   Physical Exam   Constitutional: He is oriented to person, place, and time. He appears well-developed and well-nourished.   Eyes: Pupils are equal, round, and reactive to light. Conjunctivae and EOM are normal.   Neck: Normal range of motion. Neck supple.   Cardiovascular: Normal rate, regular rhythm, normal heart sounds and intact distal pulses.   Pulmonary/Chest: Effort normal and breath sounds normal.   Abdominal: Soft. Bowel sounds are normal.   Musculoskeletal: Normal range of motion.   Neurological: He is alert and oriented to person, place, and time.   Skin: Skin is warm and dry.   Psychiatric: He has a normal mood and affect. His behavior is normal. Judgment and thought " "content normal.       Assessment:       1. Bronchitis    2. Chronic cough    3. Retinal detachment, right    4. Cigarette nicotine dependence without complication    5. BMI 22.0-22.9, adult        Plan:       Bronchitis  -     NEBULIZER FOR HOME USE  -     albuterol-ipratropium (DUO-NEB) 2.5 mg-0.5 mg/3 mL nebulizer solution; Take 3 mLs by nebulization every 6 (six) hours as needed for Wheezing. Rescue  Dispense: 1 Box; Refill: 0   Continue taking Antibiotics  Retinal detachment, right  Stable, followed by Ophthalmology, Dr. Ortiz, last visit 3/18/19    Cigarette nicotine dependence without complication  Ambulatory referral to smoking cessation  Counseled on smoking cessation    BMI 22.0-22.9, adult  Counseled patient on his ideal body weight, health consequences of being obese and current recommendations including weekly exercise and a heart healthy diet.  Current BMI is:Estimated body mass index is 22.38 kg/m² as calculated from the following:    Height as of this encounter: 6' 5" (1.956 m).    Weight as of this encounter: 85.6 kg (188 lb 11.4 oz)..  Patient is aware that ideal BMI < 25 or Weight in (lb) to have BMI = 25: 210.4.        Time spent with patient: 30 minutes    Patient with be reevaluated in 6 months or sooner prn    Greater than 50% of this visit was spent counseling as described in above documentation:Yes  "

## 2019-05-31 ENCOUNTER — TELEPHONE (OUTPATIENT)
Dept: FAMILY MEDICINE | Facility: CLINIC | Age: 24
End: 2019-05-31

## 2019-05-31 NOTE — TELEPHONE ENCOUNTER
----- Message from Ruby Shah sent at 5/31/2019  9:20 AM CDT -----  Contact: Wife/Hope  Type:  Same Day Appointment Request    Caller is requesting a same day appointment.  Caller declined first available appointment listed below.      Name of Caller:  Wife/  When is the first available appointment?  6/12  Symptoms:  Ear infection/not feeling well  Best Call Back Number:  [phone] cell  Additional Information:   n/a

## 2019-07-10 ENCOUNTER — TELEPHONE (OUTPATIENT)
Dept: OPHTHALMOLOGY | Facility: CLINIC | Age: 24
End: 2019-07-10

## 2021-04-29 ENCOUNTER — PATIENT MESSAGE (OUTPATIENT)
Dept: RESEARCH | Facility: HOSPITAL | Age: 26
End: 2021-04-29

## 2021-09-06 ENCOUNTER — HOSPITAL ENCOUNTER (EMERGENCY)
Facility: HOSPITAL | Age: 26
Discharge: HOME | End: 2021-09-06
Attending: EMERGENCY MEDICINE
Payer: MEDICAID

## 2021-09-06 VITALS
HEART RATE: 99 BPM | WEIGHT: 185 LBS | TEMPERATURE: 99 F | HEIGHT: 77 IN | SYSTOLIC BLOOD PRESSURE: 175 MMHG | RESPIRATION RATE: 16 BRPM | DIASTOLIC BLOOD PRESSURE: 100 MMHG | OXYGEN SATURATION: 99 % | BODY MASS INDEX: 21.84 KG/M2

## 2021-09-06 DIAGNOSIS — L02.91 ABSCESS: Primary | ICD-10-CM

## 2021-09-06 DIAGNOSIS — Z51.89 WOUND CHECK, ABSCESS: ICD-10-CM

## 2021-09-06 LAB
ALBUMIN SERPL BCP-MCNC: 4.7 G/DL (ref 3.5–5.2)
ALP SERPL-CCNC: 61 U/L (ref 55–135)
ALT SERPL W/O P-5'-P-CCNC: 35 U/L (ref 10–44)
ANION GAP SERPL CALC-SCNC: 11 MMOL/L (ref 8–16)
AST SERPL-CCNC: 25 U/L (ref 10–40)
BASOPHILS # BLD AUTO: 0.06 K/UL (ref 0–0.2)
BASOPHILS NFR BLD: 0.6 % (ref 0–1.9)
BILIRUB SERPL-MCNC: 0.4 MG/DL (ref 0.1–1)
BUN SERPL-MCNC: 16 MG/DL (ref 6–20)
CALCIUM SERPL-MCNC: 9.8 MG/DL (ref 8.7–10.5)
CHLORIDE SERPL-SCNC: 102 MMOL/L (ref 95–110)
CO2 SERPL-SCNC: 26 MMOL/L (ref 23–29)
CREAT SERPL-MCNC: 0.8 MG/DL (ref 0.5–1.4)
DIFFERENTIAL METHOD: NORMAL
EOSINOPHIL # BLD AUTO: 0.1 K/UL (ref 0–0.5)
EOSINOPHIL NFR BLD: 0.9 % (ref 0–8)
ERYTHROCYTE [DISTWIDTH] IN BLOOD BY AUTOMATED COUNT: 12.6 % (ref 11.5–14.5)
EST. GFR  (AFRICAN AMERICAN): >60 ML/MIN/1.73 M^2
EST. GFR  (NON AFRICAN AMERICAN): >60 ML/MIN/1.73 M^2
GLUCOSE SERPL-MCNC: 87 MG/DL (ref 70–110)
HCT VFR BLD AUTO: 41.6 % (ref 40–54)
HGB BLD-MCNC: 14 G/DL (ref 14–18)
IMM GRANULOCYTES # BLD AUTO: 0.02 K/UL (ref 0–0.04)
IMM GRANULOCYTES NFR BLD AUTO: 0.2 % (ref 0–0.5)
LACTATE SERPL-SCNC: 1.6 MMOL/L (ref 0.5–1.9)
LYMPHOCYTES # BLD AUTO: 2.3 K/UL (ref 1–4.8)
LYMPHOCYTES NFR BLD: 23.3 % (ref 18–48)
MCH RBC QN AUTO: 29.9 PG (ref 27–31)
MCHC RBC AUTO-ENTMCNC: 33.7 G/DL (ref 32–36)
MCV RBC AUTO: 89 FL (ref 82–98)
MONOCYTES # BLD AUTO: 0.8 K/UL (ref 0.3–1)
MONOCYTES NFR BLD: 8.5 % (ref 4–15)
NEUTROPHILS # BLD AUTO: 6.6 K/UL (ref 1.8–7.7)
NEUTROPHILS NFR BLD: 66.5 % (ref 38–73)
NRBC BLD-RTO: 0 /100 WBC
PLATELET # BLD AUTO: 214 K/UL (ref 150–450)
PMV BLD AUTO: 11.1 FL (ref 9.2–12.9)
POTASSIUM SERPL-SCNC: 3.7 MMOL/L (ref 3.5–5.1)
PROT SERPL-MCNC: 7.6 G/DL (ref 6–8.4)
RBC # BLD AUTO: 4.69 M/UL (ref 4.6–6.2)
SARS-COV-2 RDRP RESP QL NAA+PROBE: NEGATIVE
SODIUM SERPL-SCNC: 139 MMOL/L (ref 136–145)
WBC # BLD AUTO: 9.86 K/UL (ref 3.9–12.7)

## 2021-09-06 PROCEDURE — 10060 I&D ABSCESS SIMPLE/SINGLE: CPT

## 2021-09-06 PROCEDURE — 87147 CULTURE TYPE IMMUNOLOGIC: CPT | Performed by: EMERGENCY MEDICINE

## 2021-09-06 PROCEDURE — 87186 SC STD MICRODIL/AGAR DIL: CPT | Performed by: EMERGENCY MEDICINE

## 2021-09-06 PROCEDURE — 87070 CULTURE OTHR SPECIMN AEROBIC: CPT | Performed by: EMERGENCY MEDICINE

## 2021-09-06 PROCEDURE — 93005 ELECTROCARDIOGRAM TRACING: CPT | Performed by: INTERNAL MEDICINE

## 2021-09-06 PROCEDURE — U0002 COVID-19 LAB TEST NON-CDC: HCPCS | Performed by: NURSE PRACTITIONER

## 2021-09-06 PROCEDURE — 93010 EKG 12-LEAD: ICD-10-PCS | Mod: ,,, | Performed by: INTERNAL MEDICINE

## 2021-09-06 PROCEDURE — 99284 EMERGENCY DEPT VISIT MOD MDM: CPT | Mod: 25

## 2021-09-06 PROCEDURE — 87077 CULTURE AEROBIC IDENTIFY: CPT | Performed by: EMERGENCY MEDICINE

## 2021-09-06 PROCEDURE — 85025 COMPLETE CBC W/AUTO DIFF WBC: CPT | Performed by: NURSE PRACTITIONER

## 2021-09-06 PROCEDURE — 36415 COLL VENOUS BLD VENIPUNCTURE: CPT | Performed by: NURSE PRACTITIONER

## 2021-09-06 PROCEDURE — 87147 CULTURE TYPE IMMUNOLOGIC: CPT | Mod: 59 | Performed by: NURSE PRACTITIONER

## 2021-09-06 PROCEDURE — 93010 ELECTROCARDIOGRAM REPORT: CPT | Mod: ,,, | Performed by: INTERNAL MEDICINE

## 2021-09-06 PROCEDURE — 80053 COMPREHEN METABOLIC PANEL: CPT | Performed by: NURSE PRACTITIONER

## 2021-09-06 PROCEDURE — 87040 BLOOD CULTURE FOR BACTERIA: CPT | Mod: 59 | Performed by: NURSE PRACTITIONER

## 2021-09-06 PROCEDURE — 83605 ASSAY OF LACTIC ACID: CPT | Performed by: NURSE PRACTITIONER

## 2021-09-06 RX ORDER — LIDOCAINE HYDROCHLORIDE 10 MG/ML
10 INJECTION, SOLUTION EPIDURAL; INFILTRATION; INTRACAUDAL; PERINEURAL
Status: DISCONTINUED | OUTPATIENT
Start: 2021-09-06 | End: 2021-09-06 | Stop reason: HOSPADM

## 2021-09-06 RX ORDER — CLINDAMYCIN HYDROCHLORIDE 150 MG/1
300 CAPSULE ORAL 4 TIMES DAILY
Qty: 56 CAPSULE | Refills: 0 | Status: SHIPPED | OUTPATIENT
Start: 2021-09-06 | End: 2021-09-13

## 2021-09-08 ENCOUNTER — HOSPITAL ENCOUNTER (EMERGENCY)
Facility: HOSPITAL | Age: 26
Discharge: HOME | End: 2021-09-08
Attending: EMERGENCY MEDICINE
Payer: MEDICAID

## 2021-09-08 VITALS
OXYGEN SATURATION: 100 % | WEIGHT: 185 LBS | BODY MASS INDEX: 21.84 KG/M2 | RESPIRATION RATE: 15 BRPM | TEMPERATURE: 98 F | HEIGHT: 77 IN | HEART RATE: 85 BPM | SYSTOLIC BLOOD PRESSURE: 138 MMHG | DIASTOLIC BLOOD PRESSURE: 85 MMHG

## 2021-09-08 DIAGNOSIS — L03.90 CELLULITIS, UNSPECIFIED CELLULITIS SITE: Primary | ICD-10-CM

## 2021-09-08 LAB
BACTERIA BLD CULT: ABNORMAL
BACTERIA SPEC AEROBE CULT: ABNORMAL

## 2021-09-08 PROCEDURE — 99283 EMERGENCY DEPT VISIT LOW MDM: CPT

## 2021-09-08 PROCEDURE — 25000003 PHARM REV CODE 250: Performed by: PHYSICIAN ASSISTANT

## 2021-09-08 RX ORDER — CLINDAMYCIN HYDROCHLORIDE 300 MG/1
300 CAPSULE ORAL EVERY 6 HOURS
Qty: 40 CAPSULE | Refills: 0 | Status: SHIPPED | OUTPATIENT
Start: 2021-09-08 | End: 2021-09-18

## 2021-09-08 RX ORDER — CLINDAMYCIN HYDROCHLORIDE 150 MG/1
300 CAPSULE ORAL
Status: COMPLETED | OUTPATIENT
Start: 2021-09-08 | End: 2021-09-08

## 2021-09-08 RX ADMIN — CLINDAMYCIN HYDROCHLORIDE 300 MG: 150 CAPSULE ORAL at 10:09

## 2021-09-11 LAB — BACTERIA BLD CULT: NORMAL

## 2022-01-27 ENCOUNTER — OFFICE VISIT (OUTPATIENT)
Dept: FAMILY MEDICINE | Facility: CLINIC | Age: 27
End: 2022-01-27
Payer: MEDICAID

## 2022-01-27 DIAGNOSIS — J45.41 MODERATE PERSISTENT ASTHMA WITH ACUTE EXACERBATION: ICD-10-CM

## 2022-01-27 DIAGNOSIS — J40 BRONCHITIS: ICD-10-CM

## 2022-01-27 DIAGNOSIS — U07.1 COVID-19: Primary | ICD-10-CM

## 2022-01-27 PROCEDURE — 1159F PR MEDICATION LIST DOCUMENTED IN MEDICAL RECORD: ICD-10-PCS | Mod: CPTII,,, | Performed by: FAMILY MEDICINE

## 2022-01-27 PROCEDURE — 99999 PR PBB SHADOW E&M-EST. PATIENT-LVL II: ICD-10-PCS | Mod: PBBFAC,,, | Performed by: FAMILY MEDICINE

## 2022-01-27 PROCEDURE — 99212 OFFICE O/P EST SF 10 MIN: CPT | Mod: PBBFAC,PO | Performed by: FAMILY MEDICINE

## 2022-01-27 PROCEDURE — 1160F RVW MEDS BY RX/DR IN RCRD: CPT | Mod: CPTII,,, | Performed by: FAMILY MEDICINE

## 2022-01-27 PROCEDURE — 99214 PR OFFICE/OUTPT VISIT, EST, LEVL IV, 30-39 MIN: ICD-10-PCS | Mod: S$PBB,,, | Performed by: FAMILY MEDICINE

## 2022-01-27 PROCEDURE — 1160F PR REVIEW ALL MEDS BY PRESCRIBER/CLIN PHARMACIST DOCUMENTED: ICD-10-PCS | Mod: CPTII,,, | Performed by: FAMILY MEDICINE

## 2022-01-27 PROCEDURE — 99214 OFFICE O/P EST MOD 30 MIN: CPT | Mod: S$PBB,,, | Performed by: FAMILY MEDICINE

## 2022-01-27 PROCEDURE — 1159F MED LIST DOCD IN RCRD: CPT | Mod: CPTII,,, | Performed by: FAMILY MEDICINE

## 2022-01-27 PROCEDURE — 99999 PR PBB SHADOW E&M-EST. PATIENT-LVL II: CPT | Mod: PBBFAC,,, | Performed by: FAMILY MEDICINE

## 2022-01-27 RX ORDER — IPRATROPIUM BROMIDE AND ALBUTEROL SULFATE 2.5; .5 MG/3ML; MG/3ML
3 SOLUTION RESPIRATORY (INHALATION) EVERY 4 HOURS PRN
Qty: 150 ML | Refills: 3 | Status: SHIPPED | OUTPATIENT
Start: 2022-01-27 | End: 2022-09-01 | Stop reason: SDUPTHER

## 2022-01-27 RX ORDER — ALBUTEROL SULFATE 90 UG/1
2 AEROSOL, METERED RESPIRATORY (INHALATION) EVERY 4 HOURS PRN
Qty: 18 G | Refills: 1 | Status: SHIPPED | OUTPATIENT
Start: 2022-01-27 | End: 2022-09-01 | Stop reason: SDUPTHER

## 2022-01-27 NOTE — PATIENT INSTRUCTIONS
COVID vitamins:    Vitamin C 1,000 mg three times a day  Vitamin D3 5,000 IU once a day  (you should take this for life as well)  Zinc 50 mg twice a day    Quercetin 500 mg once a day (may need to check places like [facility] for this one)    Aspirin 81 mg a day (clot prevention)    Push fluid intake - plenty of water.      Patient Education       COVID-19 Discharge Instructions   About this topic   Coronavirus disease 2019 is also known as COVID-19. It is a viral illness that infects the lungs. It is caused by a virus called SARS-associated coronavirus (SARS-CoV-2).  The signs of COVID-19 most often start a few days after you have been infected. In some people, it takes longer to show signs. Others never show signs of the infection. You may have a cough, fever, shaking chills and it may be hard to breathe. You may be very tired, have muscle aches, a headache or sore throat. Some people have an upset stomach or loose stools. Others lose their sense of smell or taste. You may not have these signs all the time and they may come and go while you are sick.  The virus spreads easily through droplets when you talk, sneeze, or cough. You can pass the virus to others when you are talking close together, singing, hugging, sharing food, or shaking hands. Doctors believe the germs also survive on surfaces like tables, door handles, and telephones. However, this is not a common way that COVID-19 spreads. Doctors believe you can also spread the infection even if you dont have any symptoms, but they do not know how that happens. This is why getting vaccinated is one of the best ways to keep you healthy and slow the spread of the virus.  Some people have a mild case of COVID-19 and are able to stay at home and away from others until they feel better. Others may need to be in the hospital if they are very sick. Some people with COVID-19 can have some symptoms for weeks or months. People with COVID-19 must isolate themselves. You can  start to be around others when your doctor says it is safe to do so.       What care is needed at home?   · Ask your doctor what you need to do when you go home. Make sure you ask questions if you do not understand what the doctor says.  · Drink lots of water, juice, or broth to replace fluids lost from a fever.  · You may use cool mist humidifiers to help ease congestion and coughing.  · Use 2 to 3 pillows to prop yourself up when you lie down to make it easier to breathe and sleep.  · Do not smoke and do not drink beer, wine, and mixed drinks (alcohol).  · To lower the chance of passing the infection to others, get a COVID-19 vaccine after your infection has resolved.  · If you have not been fully vaccinated:  ? Wear a mask over your mouth and nose if you are around others who are not sick. Cloth masks work best if they have more than one layer of fabric.  ? Wash your hands often.  ? Stay home in a separate room, if possible, away from others. Only go out to get medical care.  ? Use a separate bathroom if possible.  ? Do not make food for others.  What follow-up care is needed?   · Your doctor may ask you to make visits to the office to check on your progress. Be sure to keep these visits. Make sure you wear a mask at these visits.  · If you can, tell the staff you have COVID-19 ahead of time so they can take extra care to stop the disease from spreading.  · It may take a few weeks before your health returns to normal.  What drugs may be needed?   The doctor may order drugs to:  · Help with breathing  · Help with fever  · Help with swelling in your airways and lungs  · Control coughing  · Ease a sore throat  · Help a runny or stuffy nose  Will physical activity be limited?   You may have to limit your physical activity. Talk to your doctor about the right amount of activity for you. If you have been very sick with COVID-19, it can take some time to get your strength back.  Will there be any other care needed?    Doctors do not know how long you can pass the virus on to others after you are sick. This is why it is important to stay in a separate room, if possible, when you are sick. For now, doctors are giving general guidelines for you to follow after you have been sick. Before you go around other people, you should:  · Be fever free for 24 hours without taking any drugs to lower the fever  · Have no symptoms of cough or shortness of breath  · Wait at least 10 days after first having symptoms or your first positive test, and you need to be symptom free as above. Some experts suggest waiting 20 days if you have had a more severe infection.  Talk with your doctor about getting a COVID-19 vaccine.  What problems could happen?   · Fluid loss. This is dehydration.  · Short-term or long-term lung damage  · Heart problems  · Death  When do I need to call the doctor?   · You are having so much trouble breathing that you can only say one or two words at a time.  · You need to sit upright at all times to be able to breathe and/or cannot lie down.  · You are very confused or cannot stay awake.  · Your lips or skin start to turn blue or grey.  · You think you might be having a medical emergency. Some examples of medical emergencies are:  ? Severe chest pain.  ? Not able to speak or move normally.  · You have trouble breathing when talking or sitting still.  · You have new shortness of breath.  · You become weak or dizzy.  · You have very dark urine or do not pass urine for more than 8 hours.  · You have new or worsening COVID-19 symptoms like:  ? Fever  ? Cough  ? Feeling very tired  ? Shaking chills  ? Headache  ? Trouble swallowing  ? Throwing up  ? Loose stools  ? Reddish purple spots on your fingers or toes  Teach Back: Helping You Understand   The Teach Back Method helps you understand the information we are giving you. After you talk with the staff, tell them in your own words what you learned. This helps to make sure the staff  has described each thing clearly. It also helps to explain things that may have been confusing. Before going home, make sure you can do these:  · I can tell you about my condition.  · I can tell you what may help ease my breathing.  · I can tell you what I can do to help avoid passing the infection to others.  · I can tell you what I will do if I have trouble breathing; feel sleepy or confused; or my fingertips, fingernails, skin, or lips are blue.  Where can I learn more?   Centers for Disease Control and Prevention  https://www.cdc.gov/coronavirus/2019-ncov/about/index.html   Centers for Disease Control and Prevention  https://www.cdc.gov/coronavirus/2019-ncov/hcp/disposition-in-home-patients.html

## 2022-01-27 NOTE — PROGRESS NOTES
Subjective:       Patient ID: Nirmal Oglesby is a 26 y.o. male.    Chief Complaint: No chief complaint on file.    New to me patient here for UC visit.  Onset 2 days ago of C&C, fever and aches all over.  C19 positive here today.  Hx of Asthma- out of all meds and needs a home nebulizer as well.  He has SOB and wheezing since onset.    Review of Systems   Constitutional: Positive for chills, fatigue and fever.   HENT: Positive for congestion, rhinorrhea and sore throat.    Respiratory: Positive for cough, shortness of breath and wheezing.    Cardiovascular: Negative for chest pain.   Gastrointestinal: Negative for abdominal pain, bowel incontinence and nausea.   Skin: Negative for rash.   Neurological: Negative for numbness.   All other systems reviewed and are negative.      Objective:      Physical Exam  Constitutional:       General: He is not in acute distress.     Comments: Televisit - pt is COVID positive.   HENT:      Nose: Congestion present.      Mouth/Throat:      Comments: Nml voice/speech  Pulmonary:      Effort: No respiratory distress.   Neurological:      Comments: Alert and interactive   Psychiatric:         Mood and Affect: Mood normal.         Thought Content: Thought content normal.         Assessment:       1. COVID-19    2. Bronchitis    3. Moderate persistent asthma with acute exacerbation        Plan:       COVID-19  -     albuterol-ipratropium (DUO-NEB) 2.5 mg-0.5 mg/3 mL nebulizer solution; Take 3 mLs by nebulization every 4 (four) hours as needed for Wheezing. Rescue  Dispense: 150 mL; Refill: 3  -     albuterol (PROVENTIL/VENTOLIN HFA) 90 mcg/actuation inhaler; Inhale 2 puffs into the lungs every 4 (four) hours as needed for Wheezing or Shortness of Breath (or cough).  Dispense: 18 g; Refill: 1  -     NEBULIZER FOR HOME USE    Bronchitis    Moderate persistent asthma with acute exacerbation  -     albuterol-ipratropium (DUO-NEB) 2.5 mg-0.5 mg/3 mL nebulizer solution; Take 3 mLs by  nebulization every 4 (four) hours as needed for Wheezing. Rescue  Dispense: 150 mL; Refill: 3  -     albuterol (PROVENTIL/VENTOLIN HFA) 90 mcg/actuation inhaler; Inhale 2 puffs into the lungs every 4 (four) hours as needed for Wheezing or Shortness of Breath (or cough).  Dispense: 18 g; Refill: 1  -     NEBULIZER FOR HOME USE      Patient Instructions     COVID vitamins:    Vitamin C 1,000 mg three times a day  Vitamin D3 5,000 IU once a day  (you should take this for life as well)  Zinc 50 mg twice a day    Quercetin 500 mg once a day (may need to check places like [facility] for this one)    Aspirin 81 mg a day (clot prevention)    Push fluid intake - plenty of water.      Patient Education       COVID-19 Discharge Instructions   About this topic   Coronavirus disease 2019 is also known as COVID-19. It is a viral illness that infects the lungs. It is caused by a virus called SARS-associated coronavirus (SARS-CoV-2).  The signs of COVID-19 most often start a few days after you have been infected. In some people, it takes longer to show signs. Others never show signs of the infection. You may have a cough, fever, shaking chills and it may be hard to breathe. You may be very tired, have muscle aches, a headache or sore throat. Some people have an upset stomach or loose stools. Others lose their sense of smell or taste. You may not have these signs all the time and they may come and go while you are sick.  The virus spreads easily through droplets when you talk, sneeze, or cough. You can pass the virus to others when you are talking close together, singing, hugging, sharing food, or shaking hands. Doctors believe the germs also survive on surfaces like tables, door handles, and telephones. However, this is not a common way that COVID-19 spreads. Doctors believe you can also spread the infection even if you dont have any symptoms, but they do not know how that happens. This is why getting vaccinated is one of the best ways  to keep you healthy and slow the spread of the virus.  Some people have a mild case of COVID-19 and are able to stay at home and away from others until they feel better. Others may need to be in the hospital if they are very sick. Some people with COVID-19 can have some symptoms for weeks or months. People with COVID-19 must isolate themselves. You can start to be around others when your doctor says it is safe to do so.       What care is needed at home?   · Ask your doctor what you need to do when you go home. Make sure you ask questions if you do not understand what the doctor says.  · Drink lots of water, juice, or broth to replace fluids lost from a fever.  · You may use cool mist humidifiers to help ease congestion and coughing.  · Use 2 to 3 pillows to prop yourself up when you lie down to make it easier to breathe and sleep.  · Do not smoke and do not drink beer, wine, and mixed drinks (alcohol).  · To lower the chance of passing the infection to others, get a COVID-19 vaccine after your infection has resolved.  · If you have not been fully vaccinated:  ? Wear a mask over your mouth and nose if you are around others who are not sick. Cloth masks work best if they have more than one layer of fabric.  ? Wash your hands often.  ? Stay home in a separate room, if possible, away from others. Only go out to get medical care.  ? Use a separate bathroom if possible.  ? Do not make food for others.  What follow-up care is needed?   · Your doctor may ask you to make visits to the office to check on your progress. Be sure to keep these visits. Make sure you wear a mask at these visits.  · If you can, tell the staff you have COVID-19 ahead of time so they can take extra care to stop the disease from spreading.  · It may take a few weeks before your health returns to normal.  What drugs may be needed?   The doctor may order drugs to:  · Help with breathing  · Help with fever  · Help with swelling in your airways and  lungs  · Control coughing  · Ease a sore throat  · Help a runny or stuffy nose  Will physical activity be limited?   You may have to limit your physical activity. Talk to your doctor about the right amount of activity for you. If you have been very sick with COVID-19, it can take some time to get your strength back.  Will there be any other care needed?   Doctors do not know how long you can pass the virus on to others after you are sick. This is why it is important to stay in a separate room, if possible, when you are sick. For now, doctors are giving general guidelines for you to follow after you have been sick. Before you go around other people, you should:  · Be fever free for 24 hours without taking any drugs to lower the fever  · Have no symptoms of cough or shortness of breath  · Wait at least 10 days after first having symptoms or your first positive test, and you need to be symptom free as above. Some experts suggest waiting 20 days if you have had a more severe infection.  Talk with your doctor about getting a COVID-19 vaccine.  What problems could happen?   · Fluid loss. This is dehydration.  · Short-term or long-term lung damage  · Heart problems  · Death  When do I need to call the doctor?   · You are having so much trouble breathing that you can only say one or two words at a time.  · You need to sit upright at all times to be able to breathe and/or cannot lie down.  · You are very confused or cannot stay awake.  · Your lips or skin start to turn blue or grey.  · You think you might be having a medical emergency. Some examples of medical emergencies are:  ? Severe chest pain.  ? Not able to speak or move normally.  · You have trouble breathing when talking or sitting still.  · You have new shortness of breath.  · You become weak or dizzy.  · You have very dark urine or do not pass urine for more than 8 hours.  · You have new or worsening COVID-19 symptoms like:  ? Fever  ? Cough  ? Feeling very  tired  ? Shaking chills  ? Headache  ? Trouble swallowing  ? Throwing up  ? Loose stools  ? Reddish purple spots on your fingers or toes  Teach Back: Helping You Understand   The Teach Back Method helps you understand the information we are giving you. After you talk with the staff, tell them in your own words what you learned. This helps to make sure the staff has described each thing clearly. It also helps to explain things that may have been confusing. Before going home, make sure you can do these:  · I can tell you about my condition.  · I can tell you what may help ease my breathing.  · I can tell you what I can do to help avoid passing the infection to others.  · I can tell you what I will do if I have trouble breathing; feel sleepy or confused; or my fingertips, fingernails, skin, or lips are blue.  Where can I learn more?   Centers for Disease Control and Prevention  https://www.cdc.gov/coronavirus/2019-ncov/about/index.html   Centers for Disease Control and Prevention  https://www.cdc.gov/coronavirus/2019-ncov/hcp/disposition-in-home-patients.html

## 2022-01-27 NOTE — LETTER
January 27, 2022    Nirmal Oglesby  [address]  [place] [facility] [address]             [place] [facility]  [facility] Medicine  [address]  [place] [address]  Phone: [phone]  Fax: [phone]   January 27, 2022     Patient: Nirmal Oglesby   YOB: 1995   Date of Visit: 1/27/2022       To Whom it May Concern:    Nirmal Oglesby was seen in my clinic on 1/27/2022.  He is positive for COVID 19 today.     He may return to work on 1/31/2022.    Please excuse him from any classes or work missed.    If you have any questions or concerns, please don't hesitate to call.    Sincerely,         Tyler Estevez MD

## 2022-08-25 ENCOUNTER — HOSPITAL ENCOUNTER (EMERGENCY)
Facility: HOSPITAL | Age: 27
Discharge: HOME | End: 2022-08-25
Attending: EMERGENCY MEDICINE
Payer: MEDICAID

## 2022-08-25 VITALS
OXYGEN SATURATION: 99 % | WEIGHT: 190 LBS | SYSTOLIC BLOOD PRESSURE: 137 MMHG | TEMPERATURE: 98 F | HEIGHT: 77 IN | BODY MASS INDEX: 22.43 KG/M2 | HEART RATE: 91 BPM | RESPIRATION RATE: 20 BRPM | DIASTOLIC BLOOD PRESSURE: 80 MMHG
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
## 2022-08-25 DIAGNOSIS — S01.81XA FACE LACERATIONS, INITIAL ENCOUNTER: Primary | ICD-10-CM

## 2022-08-25 PROCEDURE — 63600175 PHARM REV CODE 636 W HCPCS: Performed by: STUDENT IN AN ORGANIZED HEALTH CARE EDUCATION/TRAINING PROGRAM

## 2022-08-25 PROCEDURE — 99285 EMERGENCY DEPT VISIT HI MDM: CPT | Mod: 25

## 2022-08-25 PROCEDURE — 12013 RPR F/E/E/N/L/M 2.6-5.0 CM: CPT

## 2022-08-25 PROCEDURE — 63600175 PHARM REV CODE 636 W HCPCS

## 2022-08-25 PROCEDURE — 96365 THER/PROPH/DIAG IV INF INIT: CPT | Mod: 59

## 2022-08-25 PROCEDURE — 12052 INTMD RPR FACE/MM 2.6-5.0 CM: CPT

## 2022-08-25 PROCEDURE — 25000003 PHARM REV CODE 250: Performed by: STUDENT IN AN ORGANIZED HEALTH CARE EDUCATION/TRAINING PROGRAM

## 2022-08-25 RX ORDER — HYDROCODONE BITARTRATE AND ACETAMINOPHEN 5; 325 MG/1; MG/1
1 TABLET ORAL
Status: COMPLETED | OUTPATIENT
Start: 2022-08-25 | End: 2022-08-25

## 2022-08-25 RX ORDER — BACITRACIN ZINC 500 UNIT/G
OINTMENT (GRAM) TOPICAL 2 TIMES DAILY
Qty: 30 G | Refills: 0 | Status: SHIPPED | OUTPATIENT
Start: 2022-08-25 | End: 2024-02-01

## 2022-08-25 RX ORDER — LORAZEPAM 2 MG/ML
INJECTION INTRAMUSCULAR
Status: COMPLETED
Start: 2022-08-25 | End: 2022-08-25

## 2022-08-25 RX ORDER — LIDOCAINE HYDROCHLORIDE AND EPINEPHRINE 10; 10 MG/ML; UG/ML
20 INJECTION, SOLUTION INFILTRATION; PERINEURAL ONCE
Status: COMPLETED | OUTPATIENT
Start: 2022-08-25 | End: 2022-08-25

## 2022-08-25 RX ORDER — AMOXICILLIN AND CLAVULANATE POTASSIUM 875; 125 MG/1; MG/1
1 TABLET, FILM COATED ORAL 2 TIMES DAILY
Qty: 20 TABLET | Refills: 0 | Status: SHIPPED | OUTPATIENT
Start: 2022-08-25 | End: 2022-09-04

## 2022-08-25 RX ORDER — BACITRACIN 500 [USP'U]/G
OINTMENT TOPICAL
Status: COMPLETED | OUTPATIENT
Start: 2022-08-25 | End: 2022-08-25

## 2022-08-25 RX ADMIN — LIDOCAINE HYDROCHLORIDE AND EPINEPHRINE 20 ML: 10; 10 INJECTION, SOLUTION INFILTRATION; PERINEURAL at 02:08

## 2022-08-25 RX ADMIN — LORAZEPAM 1 MG: 2 INJECTION, SOLUTION INTRAMUSCULAR; INTRAVENOUS at 03:08

## 2022-08-25 RX ADMIN — AMPICILLIN SODIUM AND SULBACTAM SODIUM 3 G: 2; 1 INJECTION, POWDER, FOR SOLUTION INTRAMUSCULAR; INTRAVENOUS at 02:08

## 2022-08-25 RX ADMIN — HYDROCODONE BITARTRATE AND ACETAMINOPHEN 1 TABLET: 5; 325 TABLET ORAL at 02:08

## 2022-08-25 RX ADMIN — BACITRACIN: 500 OINTMENT TOPICAL at 04:08

## 2022-08-25 NOTE — DISCHARGE INSTRUCTIONS
Wash wound daily with soap and water, do not scrub as this can dislodge sutures.  Your sutures need to be removed in 7 days, you can go to your primary care doctor, plastic surgeon, or emergency department for removal. Signs of wound infection are purulent drainage, spreading redness, worsening pain or swelling, or fever. If you develop these signs please seek emergency care. Please take antibiotic as prescribed. Please place bacitracin to wound, can also use Aquaphor.     There are 6 sutures to your cheek.   There are 18 sutures to your forehead.      We sent a referral for follow-up with plastic surgery as the wound extended to include your forehead muscle.     Today you did not want rabies vaccination. We continue to recommend this. If you change your mind you can present to an emergency department for treatment.

## 2022-08-25 NOTE — Clinical Note
"Nirmal "Inocencio" Mendel was seen and treated in our emergency department on 8/25/2022.  He may return to work on 08/29/2022.       If you have any questions or concerns, please don't hesitate to call.      Jessy Mckeon MD"

## 2022-08-25 NOTE — ED PROVIDER NOTES
Encounter Date: 8/25/2022       History     Chief Complaint   Patient presents with    Animal Bite     Dog bite on forehead, r cheek, l leg     HPI   Nirmal ANDRZEJ Oglesby is a 27 y.o. male with PMHx Asthma and prior dx brain mass who presented for evaluation after a dog bite. He reports in his previous state of health prior to event. He reports that he was petting a stray dog when he suddenly lunged at his face and also bit down on his left calf. He denies losing consciousness, denies falling back of striking head. Reports being able to walk since episode.     They did notify animal control about animal. Do not know who the dog belonged to or if he was vaccinated.     Review of patient's allergies indicates:  No Known Allergies  Past Medical History:   Diagnosis Date    Asthma      Past Surgical History:   Procedure Laterality Date    LYMPH NODE BIOPSY      benign    SCLERAL BUCKLING Right 10/31/2018    Procedure: SCLERAL BUCKLING;  Surgeon: SHAVONNE Nevarez MD;  Location: [facility] OR [address] [place] [address];  Service: Ophthalmology;  Laterality: Right;  2.5 hr  240/270 SB  25 gauge PPV     TONSILLECTOMY      VITRECTOMY BY PARS PLANA APPROACH Right 9/3/2018    Procedure: VITRECTOMY, PARS PLANA APPROACH;  Surgeon: SHAVONNE Nevarez MD;  Location: [facility] OR [address] [place] [address];  Service: Ophthalmology;  Laterality: Right;  silicone oil injection, fluid air exchange, endolaser, peripheral iridotomy, ruptured globe revision for ruptured globe for foreign body, giant retinal tear, retinal detachment, endophthalmitis, traumatic cataract    VITRECTOMY BY PARS PLANA APPROACH Right 12/12/2018    Procedure: VITRECTOMY, PARS PLANA APPROACH;  Surgeon: SHAVONNE Nevarez MD;  Location: [facility] OR [address] [place] [address];  Service: Ophthalmology;  Laterality: Right;  25g PPV/PFO/EL/PI/SO OD     Family History   Problem Relation Age of Onset    Cancer Maternal Grandfather         lung cancer, also brain involvement     Social History     Tobacco Use    Smoking status:  Current Every Day Smoker     Packs/day: 1.00    Smokeless tobacco: Current User   Substance Use Topics    Alcohol use: No    Drug use: No     Review of Systems   Constitutional: Negative for fever.   HENT: Negative for congestion and sore throat.    Eyes: Positive for visual disturbance (chronic from prior eye injury).   Respiratory: Negative for shortness of breath.    Cardiovascular: Negative for chest pain.   Gastrointestinal: Negative for abdominal pain, nausea and vomiting.   Genitourinary: Negative for dysuria.   Musculoskeletal: Positive for myalgias (left calf near bite). Negative for back pain.   Skin: Positive for wound. Negative for rash.   Neurological: Negative for syncope, weakness and headaches.   Hematological: Does not bruise/bleed easily.   Psychiatric/Behavioral: Negative for confusion.       Physical Exam     Initial Vitals [08/25/22 0110]   BP Pulse Resp Temp SpO2   (!) 156/107 70 20 98 °F (36.7 °C) 97 %      MAP       --         Physical Exam    Nursing note and vitals reviewed.  Constitutional: He appears well-developed and well-nourished. He is not diaphoretic.   HENT:   Head: Normocephalic.       Gaping laceration, able to visualize scalp below the frontal forehead laceration. Facial movements symmetrical.   Eyes: EOM are normal.   Tear drop pupil to right eye   Neck: Neck supple.   Normal range of motion.  Cardiovascular: Normal rate, regular rhythm and intact distal pulses.   Pulmonary/Chest: Breath sounds normal. No respiratory distress. He has no wheezes.   Abdominal: Abdomen is soft. He exhibits no distension. There is no abdominal tenderness.   Musculoskeletal:         General: No tenderness or edema. Normal range of motion.      Cervical back: Normal range of motion and neck supple.     Neurological: He is alert and oriented to person, place, and time.   Skin: Skin is warm. Capillary refill takes less than 2 seconds.   Puncture bite wound to the left posterior calf    Psychiatric:  He has a normal mood and affect.         ED Course   Lac Repair    Date/Time: 8/25/2022 5:15 AM  Performed by: Jessy Mckeon MD  Authorized by: Sharri Weaver MD     Consent:     Consent obtained:  Verbal    Consent given by:  Patient    Risks, benefits, and alternatives were discussed: yes      Risks discussed:  Infection, pain, need for additional repair, poor cosmetic result and tendon damage    Alternatives discussed:  Referral  [place] protocol:     Procedure explained and questions answered to patient or proxy's satisfaction: yes      Imaging studies available: yes    Anesthesia:     Anesthesia method:  Local infiltration    Local anesthetic:  Lidocaine 1% WITH epi  Laceration details:     Location:  Face    Face location:  R cheek    Length (cm):  3    Depth (mm):  2  Pre-procedure details:     Preparation:  Patient was prepped and draped in usual sterile fashion and imaging obtained to evaluate for foreign bodies  Exploration:     Hemostasis achieved with:  Epinephrine and direct pressure    Imaging obtained comment:  CT max face    Imaging outcome: foreign body not noted      Wound exploration: wound explored through full range of motion      Wound extent: no foreign bodies/material noted and no muscle damage noted    Treatment:     Area cleansed with:  Povidone-iodine    Amount of cleaning:  Standard    Irrigation solution:  Sterile water    Irrigation volume:  500 cc    Irrigation method:  Pressure wash    Visualized foreign bodies/material removed: no      Debridement:  None  Skin repair:     Repair method:  Sutures    Suture size:  5-0    Wound skin closure material used: ethilon.    Suture technique:  Simple interrupted    Number of sutures:  6  Approximation:     Approximation:  Close  Repair type:     Repair type:  Simple  Post-procedure details:     Dressing:  Antibiotic ointment    Procedure completion:  Tolerated well, no immediate complications  Lac Repair    Date/Time: 8/25/2022 5:17  AM  Performed by: Jessy Mckeon MD  Authorized by: Sharri Weaver MD     Consent:     Consent obtained:  Verbal    Consent given by:  Patient    Risks, benefits, and alternatives were discussed: yes      Risks discussed:  Infection, pain, poor cosmetic result and need for additional repair    Alternatives discussed:  Referral  [place] protocol:     Procedure explained and questions answered to patient or proxy's satisfaction: yes    Anesthesia:     Anesthesia method:  Local infiltration    Local anesthetic:  Lidocaine 1% WITH epi  Laceration details:     Location:  Face    Face location:  Forehead    Length (cm):  5    Depth (mm):  5  Pre-procedure details:     Preparation:  Patient was prepped and draped in usual sterile fashion and imaging obtained to evaluate for foreign bodies  Exploration:     Limited defect created (wound extended): no      Hemostasis achieved with:  Epinephrine and direct pressure    Imaging obtained comment:  CT max/face and head    Imaging outcome: foreign body not noted      Wound exploration: wound explored through full range of motion and entire depth of wound visualized      Wound extent: muscle damage and nerve damage      Wound extent: no foreign bodies/material noted and no underlying fracture noted      Contaminated: no    Treatment:     Area cleansed with:  Povidone-iodine    Amount of cleaning:  Extensive    Irrigation solution:  Sterile water    Irrigation volume:  500 cc    Irrigation method:  Pressure wash    Visualized foreign bodies/material removed: no      Debridement:  None    Undermining:  Minimal    Layers/structures repaired:  Muscle fascia  Muscle fascia:     Suture size:  4-0    Suture material:  Vicryl    Suture technique:  Simple interrupted    Number of sutures:  3  Skin repair:     Repair method:  Sutures    Suture size:  5-0    Wound skin closure material used: ethilon.    Number of sutures:  18  Approximation:     Approximation:  Close  Repair type:      Repair type:  Intermediate  Post-procedure details:     Dressing:  Antibiotic ointment    Procedure completion:  Tolerated well, no immediate complications      Labs Reviewed - No data to display       Imaging Results          X-Ray Tibia Fibula 2 View Left (In process)                CT Maxillofacial Without Contrast (Final result)  Result time 08/25/22 01:56:47    Final result by Chandrakant Martinez MD (08/25/22 01:56:47)                 Narrative:    CT face without contrast    Comparison:  None    Additional pertinent history:  Dog bite on forehead    TECHNIQUE:  Multiple axial images were obtained through the facial bones without IV contrast.  Coronal and sagittal reformatted images were obtained off the axial source data.  One of the following dose optimization techniques was utilized in the performance of this exam: Automated exposure control; adjustment of the mA and/or kV according to the patient's size; or use of an iterative  reconstruction technique.  Specific details can be referenced in the facility's radiology CT exam operational policy.    FINDINGS:    Zygomas/zygomatic arches:Negative    Walls of the orbits:  Negative    Orbital floors:  Negative    Walls of the paranasal sinuses:  Negative    Pterygoid plates:  Negative    Nasal bones/nasal septum:  Severe nasal septal deviation to the left with no underlying nasal septal fracture.    Maxilla:  Negative    Mandible:  Negative    Orbits:  Postoperative changes involving the posterior chamber of the right orbit. Otherwise negative    Paranasal sinuses:  Tiny mucous retention cyst involving the left maxillary sinus.    Surrounding soft tissues:  Soft tissue laceration adjacent to the right zygoma.    IMPRESSION:  1. Soft tissue laceration adjacent to the right zygoma.  2. No underlying bony fractures.    Electronically signed by:  Chandrakant Martinez MD  8/25/2022 1:56 AM CDT Workstation: [id number]                             CT Head Without Contrast (Final  result)  Result time 08/25/22 01:54:40    Final result by Chandrakant Martinez MD (08/25/22 01:54:40)                 Narrative:    Head CT scan without contrast    COMPARISONS: None    ADDITIONAL PERTINENT HISTORY: Blunt head trauma    TECHNIQUE:  Multiple axial images were obtained from the skull base to the vertex without IV contrast. One of the following dose optimization techniques was utilized in the performance of this exam: Automated exposure control; adjustment of the mA and/or kV according to the patient's size; or use of an iterative  reconstruction technique.  Specific details can be referenced in the facility's radiology CT exam operational policy.    FINDINGS:    Midline shift: Negative    Ventricles:  Negative    Brain parenchyma:  Negative    Extra-axial spaces:  Negative    Intracranial vasculature:  Negative    Osseous structures:  Negative    Paranasal sinuses and mastoid air cells:  Tiny mucous retention cyst involving the left maxillary sinus.    Surrounding soft tissues and orbits:  Postoperative changes involving the posterior aspects of the right lobe. Otherwise negative      IMPRESSION: No acute intracranial pathology.    Electronically signed by:  Chandrakant Martinez MD  8/25/2022 1:54 AM CDT Workstation: [id number]                               Medications   HYDROcodone-acetaminophen 5-325 mg per tablet 1 tablet (1 tablet Oral Given 8/25/22 0204)   ampicillin-sulbactam 3 g in sodium chloride 0.9 % 100 mL IVPB (ready to mix system) (0 g Intravenous Stopped 8/25/22 0311)   LIDOcaine-EPINEPHrine 1%-1:100,000 injection 20 mL (20 mLs Intradermal Given 8/25/22 0248)   lorazepam (ATIVAN) 2 mg/mL injection (1 mg  Given 8/25/22 0332)   bacitracin ointment ( Topical (Top) Given 8/25/22 0421)     Medical Decision Making:   Initial Assessment:   This is a 27-year-old male who presents for evaluation after a dog bite to the face and a puncture wound to his left calf.  He was previously in his usual state of health.   On presentation vital signs stable and afebrile.  Notable to have a large gaping wound to the forehead, other scattered wounds to the face and a puncture wound to the left calf.  He is alert and answering questions appropriately.  When wound explored noted to have muscle tendon injury to the frontalis muscle.   Differential Diagnosis:   Complex laceration, retained foreign body, fracture, concussion, contusion  ED Management:  Patient's pain managed with dose of Norco.  With the high risk of infection given dose of Unasyn in ED. Discussed with patient the possibility of transferring to a hospital that has plastic surgery to complete the repair secondary to the concern of frontalis muscle being damaged and the fact that this could alter some facial expression and forehead movement.  Patient reports that he would like to have the repair done in this emergency department and would not like to be transferred.  He reports that he is up-to-date on his tetanus and received 3 doses in the last 3 years.  Also discussed the risk of rabies with the patient as unable to quarantine the dog that bit him.  Patient reports he has notified animal control.  Patient does not wish to have rabies vaccine started today.  Discussed that if he changed his mind he should re-present to the emergency department for this vaccination.  Discussed that rabies if inevitable deadly if contracted.   Laceration repair at bedside as above.  Frontalis muscle repaired.     Sent plastic surgery referral for follow-up.  Given dose of Augmentin for prophylactic wound infection treatment.  Also given RX to use bacitracin. Instructed will need suture removal in 7 days. Discussed return precautions and wound care. He is stable for discharge.     Jessy Mckeon  [employer] EM/Ped - PGY 3  5:26 AM               ED Course as of 08/25/22 0611   Thu Aug 25, 2022   0200 CT Head Without Contrast  IMPRESSION: No acute intracranial pathology. [EA]   0200 CT Maxillofacial  Without Contrast  IMPRESSION:  1. Soft tissue laceration adjacent to the right zygoma.  2. No underlying bony fractures. [EA]   0202 X-Ray Tibia Fibula 2 View Left  No notable fracture [EA]   0303 Right cheek, 6 sutures of 5.0 ethilon placed [EA]      ED Course User Index  [EA] Jessy Mckeon MD           I saw and examined the patient.  I have reviewed and agree with the resident's findings, including all diagnostic interpretations and plans as written.  I was present for the key portions of the separately billed procedures.    27-year-old male presents emergency department with extensive facial lacerations after being bitten by a stray dog.  Imaging negative for acute traumatic abnormality.  After discussion of risks and benefits of repair here in this emergency department the patient requested to stay here and have the repair done rather than being transferred for plastic surgery.  Frontalis muscle and skin were repaired primarily at bedside with good approximation and symmetry.  Patient given dose of Unasyn.  Discussed risks and benefits of rabies vaccination extensively with the patient.  He displays normal decision-making capacity and is alert and oriented x4.  His friends were present for this discussion.  He has opted not to receive rabies vaccination at this time despite being aware of the significant risks.  Will discharge with Augmentin and outpatient Plastic surgery follow-up should he desire revision at any point.  Counseled on wound care, suture removal and scar prevention guidelines.  Follow-up with PCP for wound check in 2 days. The patient is aware of and agrees with the plan of care. Detailed return precautions discussed.    Sharri Weaver MD  Emergency Medicine  08/25/2022 6:11 AM        Clinical Impression:   Final diagnoses:  [W54.0XXA] Dog bite  [S01.81XA] Face lacerations, initial encounter (Primary)          ED Disposition Condition    Discharge Stable        ED Prescriptions      Medication Sig Dispense Start Date End Date Auth. Provider    amoxicillin-clavulanate 875-125mg (AUGMENTIN) 875-125 mg per tablet Take 1 tablet by mouth 2 (two) times daily. for 10 days 20 tablet 8/25/2022 9/4/2022 Jessy Mckeon MD    bacitracin 500 unit/gram Oint Apply topically 2 (two) times daily. Apply to dog bite wound 30 g 8/25/2022  Jessy Mckeon MD        Follow-up Information     Follow up With Specialties Details Why Contact Info Additional Information    [facility] - Emergency Dept Emergency Medicine Go to  As needed, If symptoms worsen [address] [facility] [address]  [phone] 1st floor    [facility] Plastic Surgery Schedule an appointment as soon as possible for a visit in 1 week  [address] [address]     [facility]  Schedule an appointment as soon as possible for a visit in 3 days For wound re-check [address] [facility] [address]  [phone]              Jessy Mckeon MD  Resident  08/25/22 0526       Sharri Weaver MD  08/25/22 0675

## 2022-08-26 ENCOUNTER — TELEPHONE (OUTPATIENT)
Dept: PLASTIC SURGERY | Facility: CLINIC | Age: 27
End: 2022-08-26
Payer: MEDICAID

## 2022-08-26 ENCOUNTER — HOSPITAL ENCOUNTER (EMERGENCY)
Facility: HOSPITAL | Age: 27
Discharge: LEFT BEFORE DISCHARGE | End: 2022-08-26
Attending: EMERGENCY MEDICINE
Payer: MEDICAID

## 2022-08-26 ENCOUNTER — TELEPHONE (OUTPATIENT)
Dept: FAMILY MEDICINE | Facility: CLINIC | Age: 27
End: 2022-08-26
Payer: MEDICAID

## 2022-08-26 VITALS
WEIGHT: 190 LBS | BODY MASS INDEX: 22.53 KG/M2 | HEART RATE: 88 BPM | SYSTOLIC BLOOD PRESSURE: 142 MMHG | OXYGEN SATURATION: 99 % | TEMPERATURE: 98 F | RESPIRATION RATE: 20 BRPM | DIASTOLIC BLOOD PRESSURE: 96 MMHG

## 2022-08-26 DIAGNOSIS — Z51.89 VISIT FOR WOUND CHECK: Primary | ICD-10-CM

## 2022-08-26 PROCEDURE — 99282 EMERGENCY DEPT VISIT SF MDM: CPT

## 2022-08-26 RX ORDER — AMPICILLIN AND SULBACTAM 2; 1 G/1; G/1
3 INJECTION, POWDER, FOR SOLUTION INTRAMUSCULAR; INTRAVENOUS
Status: DISCONTINUED | OUTPATIENT
Start: 2022-08-26 | End: 2022-08-26 | Stop reason: HOSPADM

## 2022-08-26 RX ORDER — HYDROCODONE BITARTRATE AND ACETAMINOPHEN 5; 325 MG/1; MG/1
1 TABLET ORAL
Status: DISCONTINUED | OUTPATIENT
Start: 2022-08-26 | End: 2022-08-26 | Stop reason: HOSPADM

## 2022-08-26 NOTE — TELEPHONE ENCOUNTER
Attempted to contact pt to discuss scheduling.  Pt was not available at the time of the call. I left a detailed VM asking pt to call [facility] office at his convenience.

## 2022-08-26 NOTE — TELEPHONE ENCOUNTER
----- Message from Chacho Wilson sent at 8/26/2022 10:04 AM CDT -----  Type:  Same Day Appointment Request    Caller is requesting a same day appointment.  Caller declined first available appointment listed below.      Name of Caller: Patient   When is the first available appointment? 08/29 with anyone at the [facility] location  Symptoms: Bit by dog in face, face swelling   Best Call Back Number: [phone]  Additional Information: Please assist, thank you!

## 2022-08-26 NOTE — TELEPHONE ENCOUNTER
"No appointments available in office today. Call placed to patient for notification, and to advised to be seen at ER for dog bite in face. Call answered by patient's spouse (Sharon). Advised no appointments available, advised for patient to be seen at ER. Mrs. Herrera requested to schedule an appointment for next week in clinic. Advised best practice is for patient to be seen today at ER for dog bite in face, appointment next week would not be advised. Mrs. Herrera stated "I'm not talking about for the dog bite. I'm talking about for a fucking appointment with a general practitioner next week." Call abruptly ended as Mrs. Herrera hung up the phone.   "

## 2022-08-26 NOTE — ED PROVIDER NOTES
Encounter Date: 8/26/2022       History     Chief Complaint   Patient presents with    Wound Check     Dog bite to forehead, sutured yesterday.  Swelling today     HPI   Nirmal Oglesby is a 27 y.o. male who presented for evaluation of wound check and suture placement to forehead and right cheek yesterday. He has been having pain mostly to his forehead area and feels like it is more swollen. No fevers. He has not picked up his antibiotic for wound care ppx for animal bite. He is requesting rabies treatment today.     Review of patient's allergies indicates:  No Known Allergies  Past Medical History:   Diagnosis Date    Asthma      Past Surgical History:   Procedure Laterality Date    LYMPH NODE BIOPSY      benign    SCLERAL BUCKLING Right 10/31/2018    Procedure: SCLERAL BUCKLING;  Surgeon: SHAVONNE Nevarez MD;  Location: [facility] OR [address] [place] [address];  Service: Ophthalmology;  Laterality: Right;  2.5 hr  240/270 SB  25 gauge PPV     TONSILLECTOMY      VITRECTOMY BY PARS PLANA APPROACH Right 9/3/2018    Procedure: VITRECTOMY, PARS PLANA APPROACH;  Surgeon: SHAVONNE Nevarez MD;  Location: [facility] OR [address] [place] [address];  Service: Ophthalmology;  Laterality: Right;  silicone oil injection, fluid air exchange, endolaser, peripheral iridotomy, ruptured globe revision for ruptured globe for foreign body, giant retinal tear, retinal detachment, endophthalmitis, traumatic cataract    VITRECTOMY BY PARS PLANA APPROACH Right 12/12/2018    Procedure: VITRECTOMY, PARS PLANA APPROACH;  Surgeon: SHAVONNE Nevarez MD;  Location: [facility] OR [address] [place] [address];  Service: Ophthalmology;  Laterality: Right;  25g PPV/PFO/EL/PI/SO OD     Family History   Problem Relation Age of Onset    Cancer Maternal Grandfather         lung cancer, also brain involvement     Social History     Tobacco Use    Smoking status: Current Every Day Smoker     Packs/day: 1.00    Smokeless tobacco: Current User   Substance Use Topics    Alcohol use: No    Drug use: No      Review of Systems   Constitutional: Negative for fever.   HENT: Negative for congestion.    Respiratory: Negative for cough.    Cardiovascular: Negative for chest pain.   Gastrointestinal: Negative for abdominal pain.   Musculoskeletal: Negative for gait problem.   Skin: Positive for wound.   Psychiatric/Behavioral: Negative for confusion.       Physical Exam     Initial Vitals [08/26/22 0108]   BP Pulse Resp Temp SpO2   (!) 142/96 88 20 98.3 °F (36.8 °C) 99 %      MAP       --         Physical Exam    Constitutional: He is not diaphoretic. No distress.   HENT:   Head: Normocephalic.   Laceration repair intact  No surrounding erythema  Tenderness to the surrounding area  Some increased edema under sutured wound   Eyes: EOM are normal.   Neck: Neck supple.   Cardiovascular: Normal heart sounds.   Pulmonary/Chest: No respiratory distress.   Abdominal: Abdomen is soft. He exhibits no distension. There is no abdominal tenderness.   Musculoskeletal:      Cervical back: Neck supple.     Neurological: He is alert.   Skin:   Laceration repair intact   Psychiatric: His behavior is normal.         ED Course   Procedures  Labs Reviewed   CULTURE, BLOOD   CULTURE, BLOOD   CBC W/ AUTO DIFFERENTIAL   COMPREHENSIVE METABOLIC PANEL   LACTIC ACID, PLASMA   SEDIMENTATION RATE   C-REACTIVE PROTEIN          Imaging Results    None          Medications   ampicillin-sulbactam injection 3 g (has no administration in time range)   HYDROcodone-acetaminophen 5-325 mg per tablet 1 tablet (has no administration in time range)   rabies vaccine, PCEC injection 2.5 Units (has no administration in time range)   rabies immune globulin (PF) (HYPERRAB) injection 1,710 Units (has no administration in time range)     Medical Decision Making:   Initial Assessment:   Planned for infectious work-up, IV abx. Planned for repeat CT images with contrast.   Patient initially evaluated in triage area, prior to getting room for complete evaluation he has  eloped. Unable to discuss treatment plan with patient or obtain labs prior to discharge. He was provided with antibiotics at discharge from prior ED visit.     If patient represents continue to advocate for rabies treatment in setting of stay dog bite. If patient represents continues to be at high risk for wound infection.     Jessy Mckeon  [employer] EM/Ped - PGY 3  3:33 AM               ED Course as of 08/26/22 0334   Fri Aug 26, 2022   0137 Discussed with radiology including forehead region in CT max face [EA]      ED Course User Index  [EA] Jessy Mckeon MD             Clinical Impression:   Final diagnoses:  [Z51.89] Visit for wound check (Primary)          ED Disposition Condition    Eloped               Jessy Mckeon MD  Resident  08/26/22 0334

## 2022-09-01 ENCOUNTER — OFFICE VISIT (OUTPATIENT)
Dept: FAMILY MEDICINE | Facility: CLINIC | Age: 27
End: 2022-09-01
Payer: MEDICAID

## 2022-09-01 VITALS
SYSTOLIC BLOOD PRESSURE: 130 MMHG | WEIGHT: 193.81 LBS | TEMPERATURE: 99 F | DIASTOLIC BLOOD PRESSURE: 70 MMHG | BODY MASS INDEX: 22.88 KG/M2 | HEART RATE: 93 BPM | OXYGEN SATURATION: 97 % | HEIGHT: 77 IN

## 2022-09-01 DIAGNOSIS — J45.41 MODERATE PERSISTENT ASTHMA WITH ACUTE EXACERBATION: ICD-10-CM

## 2022-09-01 DIAGNOSIS — S01.81XA FACE LACERATIONS, INITIAL ENCOUNTER: Primary | ICD-10-CM

## 2022-09-01 DIAGNOSIS — S01.85XA DOG BITE OF FACE, INITIAL ENCOUNTER: ICD-10-CM
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
## 2022-09-01 DIAGNOSIS — U07.1 COVID-19: ICD-10-CM

## 2022-09-01 PROCEDURE — 3008F BODY MASS INDEX DOCD: CPT | Mod: CPTII,,, | Performed by: FAMILY MEDICINE

## 2022-09-01 PROCEDURE — 3075F SYST BP GE 130 - 139MM HG: CPT | Mod: CPTII,,, | Performed by: FAMILY MEDICINE

## 2022-09-01 PROCEDURE — 1159F PR MEDICATION LIST DOCUMENTED IN MEDICAL RECORD: ICD-10-PCS | Mod: CPTII,,, | Performed by: FAMILY MEDICINE

## 2022-09-01 PROCEDURE — 1159F MED LIST DOCD IN RCRD: CPT | Mod: CPTII,,, | Performed by: FAMILY MEDICINE

## 2022-09-01 PROCEDURE — 99213 PR OFFICE/OUTPT VISIT, EST, LEVL III, 20-29 MIN: ICD-10-PCS | Mod: S$PBB,,, | Performed by: FAMILY MEDICINE

## 2022-09-01 PROCEDURE — 3078F DIAST BP <80 MM HG: CPT | Mod: CPTII,,, | Performed by: FAMILY MEDICINE

## 2022-09-01 PROCEDURE — 99999 PR PBB SHADOW E&M-EST. PATIENT-LVL III: CPT | Mod: PBBFAC,,, | Performed by: FAMILY MEDICINE

## 2022-09-01 PROCEDURE — 3075F PR MOST RECENT SYSTOLIC BLOOD PRESS GE 130-139MM HG: ICD-10-PCS | Mod: CPTII,,, | Performed by: FAMILY MEDICINE

## 2022-09-01 PROCEDURE — 99213 OFFICE O/P EST LOW 20 MIN: CPT | Mod: S$PBB,,, | Performed by: FAMILY MEDICINE

## 2022-09-01 PROCEDURE — 3008F PR BODY MASS INDEX (BMI) DOCUMENTED: ICD-10-PCS | Mod: CPTII,,, | Performed by: FAMILY MEDICINE

## 2022-09-01 PROCEDURE — 99213 OFFICE O/P EST LOW 20 MIN: CPT | Mod: PBBFAC,PO | Performed by: FAMILY MEDICINE

## 2022-09-01 PROCEDURE — 3078F PR MOST RECENT DIASTOLIC BLOOD PRESSURE < 80 MM HG: ICD-10-PCS | Mod: CPTII,,, | Performed by: FAMILY MEDICINE

## 2022-09-01 PROCEDURE — 99999 PR PBB SHADOW E&M-EST. PATIENT-LVL III: ICD-10-PCS | Mod: PBBFAC,,, | Performed by: FAMILY MEDICINE

## 2022-09-01 RX ORDER — ALBUTEROL SULFATE 90 UG/1
2 AEROSOL, METERED RESPIRATORY (INHALATION) EVERY 4 HOURS PRN
Qty: 18 G | Refills: 1 | Status: SHIPPED | OUTPATIENT
Start: 2022-09-01

## 2022-09-01 RX ORDER — IPRATROPIUM BROMIDE AND ALBUTEROL SULFATE 2.5; .5 MG/3ML; MG/3ML
3 SOLUTION RESPIRATORY (INHALATION) EVERY 4 HOURS PRN
Qty: 150 ML | Refills: 3 | Status: SHIPPED | OUTPATIENT
Start: 2022-09-01 | End: 2024-02-01

## 2022-09-01 NOTE — PROGRESS NOTES
Subjective:       Patient ID: Nirmal Oglesby is a 27 y.o. male.    Chief Complaint: No chief complaint on file.    Patient here for UC visit.  F/u of lacerations to face 2T dog bite - sutured at ERV 7 days ago.  Healing well; no sign of infection; taking Augmentin.  Some numbness in forehead area around wound - was a full thickness laceration to bone.  Tetanus status is UTD.  Dog was a stray; interacted normally with a group of friends the pt was with.  Pt then bent down face to face to dog's level and the dog bit him in face and left calf.      Review of Systems   Constitutional:  Negative for fever.   Respiratory:  Negative for shortness of breath.    Cardiovascular:  Negative for chest pain.   Gastrointestinal:  Negative for abdominal pain and nausea.   Skin:  Negative for rash.   Neurological:  Negative for numbness.   All other systems reviewed and are negative.    Objective:      Physical Exam  Constitutional:       General: He is not in acute distress.     Appearance: He is well-developed.   HENT:      Head:        Comments: Three sutured lacs - all well opposed and healing nicely.  No sign of infection.  Sutures removed and steri-strips placed.  Cardiovascular:      Rate and Rhythm: Normal rate and regular rhythm.      Heart sounds: No murmur heard.  Pulmonary:      Effort: Pulmonary effort is normal.      Breath sounds: Normal breath sounds.   Musculoskeletal:        Legs:        Assessment:       1. Face lacerations, initial encounter    2. Dog bite of face, initial encounter    3. Moderate persistent asthma with acute exacerbation    4. COVID-19          Plan:       Face lacerations, initial encounter    Dog bite of face, initial encounter    Moderate persistent asthma with acute exacerbation  -     NEBULIZER FOR HOME USE  -     NEBULIZER KIT (SUPPLIES) FOR HOME USE  -     albuterol-ipratropium (DUO-NEB) 2.5 mg-0.5 mg/3 mL nebulizer solution; Take 3 mLs by nebulization every 4 (four) hours as needed  for Wheezing. Rescue  Dispense: 150 mL; Refill: 3  -     albuterol (PROVENTIL/VENTOLIN HFA) 90 mcg/actuation inhaler; Inhale 2 puffs into the lungs every 4 (four) hours as needed for Wheezing or Shortness of Breath (or cough).  Dispense: 18 g; Refill: 1    COVID-19  -     albuterol-ipratropium (DUO-NEB) 2.5 mg-0.5 mg/3 mL nebulizer solution; Take 3 mLs by nebulization every 4 (four) hours as needed for Wheezing. Rescue  Dispense: 150 mL; Refill: 3  -     albuterol (PROVENTIL/VENTOLIN HFA) 90 mcg/actuation inhaler; Inhale 2 puffs into the lungs every 4 (four) hours as needed for Wheezing or Shortness of Breath (or cough).  Dispense: 18 g; Refill: 1    Patient Instructions   Complete antibiotics.  Keep strips in place until they loosen up.    Contact the clinic if any worsening or for any new concerns as we discussed.

## 2022-09-01 NOTE — PATIENT INSTRUCTIONS
Complete antibiotics.  Keep strips in place until they loosen up.    Contact the clinic if any worsening or for any new concerns as we discussed.

## 2022-10-02 ENCOUNTER — HOSPITAL ENCOUNTER (EMERGENCY)
Facility: HOSPITAL | Age: 27
Discharge: HOME | End: 2022-10-03
Attending: EMERGENCY MEDICINE
Payer: MEDICAID

## 2022-10-02 VITALS
TEMPERATURE: 98 F | HEIGHT: 77 IN | RESPIRATION RATE: 16 BRPM | OXYGEN SATURATION: 99 % | DIASTOLIC BLOOD PRESSURE: 100 MMHG | BODY MASS INDEX: 23.62 KG/M2 | WEIGHT: 200 LBS | HEART RATE: 89 BPM | SYSTOLIC BLOOD PRESSURE: 160 MMHG

## 2022-10-02 DIAGNOSIS — T17.1XXA FOREIGN BODY IN NOSE, INITIAL ENCOUNTER: Primary | ICD-10-CM

## 2022-10-02 DIAGNOSIS — J34.89 NASAL PAIN: ICD-10-CM

## 2022-10-02 DIAGNOSIS — S09.92XA: ICD-10-CM

## 2022-10-02 PROCEDURE — 99283 EMERGENCY DEPT VISIT LOW MDM: CPT

## 2022-10-02 PROCEDURE — 10120 INC&RMVL FB SUBQ TISS SMPL: CPT

## 2022-10-02 NOTE — Clinical Note
"Nirmal "Inocencio" Mendel was seen and treated in our emergency department on 10/2/2022.  He may return to work on 10/05/2022.       If you have any questions or concerns, please don't hesitate to call.      Trinh Mccarthy NP"

## 2022-10-03 PROCEDURE — 10120 INC&RMVL FB SUBQ TISS SMPL: CPT

## 2022-10-03 RX ORDER — LIDOCAINE HYDROCHLORIDE 10 MG/ML
10 INJECTION, SOLUTION EPIDURAL; INFILTRATION; INTRACAUDAL; PERINEURAL ONCE
Status: DISCONTINUED | OUTPATIENT
Start: 2022-10-03 | End: 2022-10-03 | Stop reason: HOSPADM

## 2022-10-03 RX ORDER — LIDOCAINE HYDROCHLORIDE 10 MG/ML
5 INJECTION, SOLUTION EPIDURAL; INFILTRATION; INTRACAUDAL; PERINEURAL
Status: DISCONTINUED | OUTPATIENT
Start: 2022-10-03 | End: 2022-10-03 | Stop reason: HOSPADM

## 2022-10-03 RX ORDER — AMOXICILLIN AND CLAVULANATE POTASSIUM 875; 125 MG/1; MG/1
1 TABLET, FILM COATED ORAL 2 TIMES DAILY
Qty: 14 TABLET | Refills: 0 | Status: SHIPPED | OUTPATIENT
Start: 2022-10-03 | End: 2024-02-01

## 2022-10-03 NOTE — ED PROVIDER NOTES
Encounter Date: 10/2/2022       History     Chief Complaint   Patient presents with    Foreign Body in Nose     Pt presents to ER with c/o fish hook to right nostril sustained two hours ago.      27-year-old male presents to the ER with concerns of a metal fish stuck to his inner right nose, that he sustained tonight while fishing in a fresh water pond. He states he thought he had a fish on the fishing line,  which made him whip the pole back to set the hook in fish mouth, but  instead, ripped the hook out of fish's mouth before being able to bring onshore and the hook bounced back and and struck him in his nose and imbedded in his right nose.  He reports pain and tenderness to site. No active bleeding. He was unable to get out on his own so he decided to come to the ER for further mgmt.  He states his tetanus is up-to-date and he had a tetanus injection 1 year ago after sustaining a laceration wound to his head.  He denies other injury.  He reports no active bleeding.  Who presents with a gold fish hook imbedded in his nose.       Review of patient's allergies indicates:  No Known Allergies  Past Medical History:   Diagnosis Date    Asthma      Past Surgical History:   Procedure Laterality Date    LYMPH NODE BIOPSY      benign    SCLERAL BUCKLING Right 10/31/2018    Procedure: SCLERAL BUCKLING;  Surgeon: SHAVONNE Nevarez MD;  Location: [facility] OR [address] [place] [address];  Service: Ophthalmology;  Laterality: Right;  2.5 hr  240/270 SB  25 gauge PPV     TONSILLECTOMY      VITRECTOMY BY PARS PLANA APPROACH Right 9/3/2018    Procedure: VITRECTOMY, PARS PLANA APPROACH;  Surgeon: SHAVONNE Nevarez MD;  Location: [facility] OR [address] [place] [address];  Service: Ophthalmology;  Laterality: Right;  silicone oil injection, fluid air exchange, endolaser, peripheral iridotomy, ruptured globe revision for ruptured globe for foreign body, giant retinal tear, retinal detachment, endophthalmitis, traumatic cataract    VITRECTOMY BY PARS PLANA APPROACH Right  12/12/2018    Procedure: VITRECTOMY, PARS PLANA APPROACH;  Surgeon: SHAVONNE Nevarez MD;  Location: [facility] OR [address] [place] [address];  Service: Ophthalmology;  Laterality: Right;  25g PPV/PFO/EL/PI/SO OD     Family History   Problem Relation Age of Onset    Cancer Maternal Grandfather         lung cancer, also brain involvement     Social History     Tobacco Use    Smoking status: Every Day     Packs/day: 1.00     Types: Cigarettes    Smokeless tobacco: Current   Substance Use Topics    Alcohol use: No    Drug use: No     Review of Systems   Constitutional:  Negative for chills, diaphoresis, fatigue and fever.   HENT:  Positive for facial swelling. Negative for congestion, dental problem, drooling, ear discharge, ear pain, nosebleeds, postnasal drip, rhinorrhea, sinus pressure, sinus pain, sneezing and sore throat.    Eyes:  Negative for photophobia and visual disturbance.   Respiratory:  Negative for shortness of breath.    Cardiovascular:  Negative for chest pain.   Gastrointestinal:  Negative for abdominal pain and nausea.   Genitourinary:  Negative for dysuria.   Musculoskeletal:  Negative for back pain.   Skin:  Positive for wound. Negative for rash.   Neurological:  Negative for weakness.   Hematological:  Does not bruise/bleed easily.   All other systems reviewed and are negative.    Physical Exam     Initial Vitals [10/02/22 2234]   BP Pulse Resp Temp SpO2   (!) 160/100 89 16 98.1 °F (36.7 °C) 99 %      MAP       --         Physical Exam    Nursing note and vitals reviewed.  Constitutional: He appears well-developed and well-nourished. He is not diaphoretic.   HENT:   Head: Normocephalic and atraumatic.   Right Ear: External ear normal.   Left Ear: External ear normal.   Nose: Sinus tenderness present. Foreign body is present.       Mouth/Throat: Oropharynx is clear and moist.   Eyes: Conjunctivae are normal.   Neck: Neck supple.   Normal range of motion.  Cardiovascular:  Normal rate.           Pulmonary/Chest: Breath  sounds normal.   Abdominal: Abdomen is soft.   Musculoskeletal:         General: Normal range of motion.      Cervical back: Normal range of motion and neck supple.     Neurological: He is alert and oriented to person, place, and time. He has normal strength. GCS score is 15. GCS eye subscore is 4. GCS verbal subscore is 5. GCS motor subscore is 6.   Skin: Skin is warm and dry. Capillary refill takes less than 2 seconds.   Psychiatric: His mood appears anxious.       ED Course   Foreign Body    Date/Time: 10/3/2022 3:35 AM  Performed by: Trinh Mccarthy NP  Authorized by: Bruno Weeks MD   Consent Done: Emergent Situation  Body area: nose  Location details: right nostril  Anesthesia: local infiltration    Anesthesia:  Local Anesthetic: lidocaine 1% without epinephrine  Anesthetic total: 3 mL  Patient restrained: no  Patient cooperative: yes  Localization method: visualized and probed  Removal mechanism: forceps  Complexity: complex  1 objects recovered.  Objects recovered: 1 intact gold fishhook  Post-procedure assessment: foreign body removed  Patient tolerance: Patient tolerated the procedure well with no immediate complications    Labs Reviewed - No data to display       Imaging Results              X-Ray Nasal Bones (In process)                      Medications - No data to display    Medical Decision Making:   Clinical Tests:   Radiological Study: Ordered and Reviewed  Obtained xr nasal bones to better approximate the depth and location of fish hook embedded in nose. We used local 1% lidocaine w/out epi to anesthestized, a small sub centermeter superficial laceration was made just next to the insertion site to better allow the nicol of the hook to be able to be backed out. Dr. Weeks assisted for this procedure and was the one able to fully remove the fish hook successfully. Patient much relieved now that hook is out of nose. We cleansed extensively with chlorhexidine prior to and after the removal of  the fish hook.  This was [place] water upon he was fishing in.  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  At this time no active infection, but we will cover for prophylaxis infection considering the location she was in her nose and the dirty fishhook with Augmentin antibiotic that will cover for cough and or aneurysm nasal passageways and well as knee organisms in fresh water, nasal Bactroban ointment, and have patient continue monitor symptoms follow-up with PCP closely in 3 days for ER visit follow-up re-evaluation also will provide ENT for referral if he has any ongoing cosmetic or additional concerns after the foreign body removal re-evaluation.  He has been instructed if there any concerns for developing infection despite taking his antibiotic and keeping it clean to return promptly to the ER for re-evaluation.  He is happy with this plan.          Attending Attestation:     Physician Attestation Statement for NP/PA:   I have conducted a face to face encounter with this patient in addition to the NP/PA, due to NP/PA Request    Other NP/PA Attestation Additions:    History of Present Illness: Patient has a fishhook stuck in his right naris   Physical Exam: Foreign body right naris    Procedure Note:  I assisted with fishhook removal as documented                       Clinical Impression:   Final diagnoses:  [J34.89] Nasal pain  [J34.89] Nasal pain - access foreign body position (fish hook) in nose  [T17.1XXA] Foreign body in nose, initial encounter (Primary)  [S09.92XA] Fish hook in nose        ED Disposition Condition    Discharge Stable          ED Prescriptions       Medication Sig Dispense Start Date End Date Auth. Provider    amoxicillin-clavulanate 875-125mg (AUGMENTIN) 875-125 mg per tablet Take 1 tablet by mouth 2 (two) times daily. 14 tablet 10/3/2022 -- Trinh Mccarthy NP    BACTROBAN NASAL 2 % Oint 1 g by Nasal route 2 (two) times  daily. for 7 days 14 g 10/3/2022 10/10/2022 Trinh Mccarthy NP          Follow-up Information       Follow up With Specialties Details Why Contact Info Additional Information    Luis Enrique Hernandez MD Otolaryngology Schedule an appointment as soon as possible for a visit in 3 days As needed, If symptoms worsen [address] [facility] EAR, NOSE AND THROAT  [facility] [address]  [phone]       [facility] Schedule an appointment as soon as possible for a visit in 3 days for ER visit follow up and re-evaluation, For wound re-check [address] [facility] [address]  [phone]       [facility] - Emergency Dept Emergency Medicine Go to  As needed, If symptoms worsen [address] [facility] [address]  [phone] 1st floor             Trinh Mccarthy NP  10/03/22 0342       Bruno Weeks MD  10/03/22 0413

## 2022-11-13 ENCOUNTER — HOSPITAL ENCOUNTER (EMERGENCY)
Facility: HOSPITAL | Age: 27
Discharge: HOME | End: 2022-11-14
Attending: EMERGENCY MEDICINE
Payer: MEDICAID

## 2022-11-13 DIAGNOSIS — H16.9 KERATITIS: ICD-10-CM

## 2022-11-13 DIAGNOSIS — S05.02XA ABRASION OF LEFT CORNEA, INITIAL ENCOUNTER: Primary | ICD-10-CM

## 2022-11-13 PROCEDURE — 99283 EMERGENCY DEPT VISIT LOW MDM: CPT

## 2022-11-13 PROCEDURE — 25000003 PHARM REV CODE 250: Performed by: EMERGENCY MEDICINE

## 2022-11-13 RX ORDER — ERYTHROMYCIN 5 MG/G
OINTMENT OPHTHALMIC
Status: COMPLETED | OUTPATIENT
Start: 2022-11-13 | End: 2022-11-14

## 2022-11-13 RX ORDER — TETRACAINE HYDROCHLORIDE 5 MG/ML
2 SOLUTION OPHTHALMIC
Status: COMPLETED | OUTPATIENT
Start: 2022-11-13 | End: 2022-11-13

## 2022-11-13 RX ADMIN — TETRACAINE HYDROCHLORIDE 2 DROP: 5 SOLUTION OPHTHALMIC at 11:11

## 2022-11-13 RX ADMIN — FLUORESCEIN SODIUM 1 EACH: 1 STRIP OPHTHALMIC at 11:11

## 2022-11-14 ENCOUNTER — TELEPHONE (OUTPATIENT)
Dept: OPHTHALMOLOGY | Facility: CLINIC | Age: 27
End: 2022-11-14
Payer: MEDICAID

## 2022-11-14 VITALS
WEIGHT: 195 LBS | DIASTOLIC BLOOD PRESSURE: 91 MMHG | HEART RATE: 113 BPM | TEMPERATURE: 98 F | HEIGHT: 77 IN | BODY MASS INDEX: 23.02 KG/M2 | SYSTOLIC BLOOD PRESSURE: 143 MMHG | RESPIRATION RATE: 18 BRPM

## 2022-11-14 PROCEDURE — 25000003 PHARM REV CODE 250: Performed by: EMERGENCY MEDICINE

## 2022-11-14 RX ORDER — OXYCODONE AND ACETAMINOPHEN 5; 325 MG/1; MG/1
2 TABLET ORAL
Status: COMPLETED | OUTPATIENT
Start: 2022-11-14 | End: 2022-11-14

## 2022-11-14 RX ORDER — ERYTHROMYCIN 5 MG/G
OINTMENT OPHTHALMIC
Qty: 3.5 G | Refills: 1 | Status: SHIPPED | OUTPATIENT
Start: 2022-11-14

## 2022-11-14 RX ORDER — OXYCODONE AND ACETAMINOPHEN 5; 325 MG/1; MG/1
1 TABLET ORAL EVERY 4 HOURS PRN
Qty: 12 TABLET | Refills: 0 | Status: SHIPPED | OUTPATIENT
Start: 2022-11-14 | End: 2024-02-01

## 2022-11-14 RX ADMIN — ERYTHROMYCIN 1 INCH: 5 OINTMENT OPHTHALMIC at 12:11

## 2022-11-14 RX ADMIN — OXYCODONE AND ACETAMINOPHEN 2 TABLET: 325; 5 TABLET ORAL at 12:11

## 2022-11-14 NOTE — ED PROVIDER NOTES
"Encounter Date: 11/13/2022       History     Chief Complaint   Patient presents with    Eye Pain     "Welders flash".  Patient is not concerned with his cough or congestion, says they have been going on for days, and he is treating them at home.     27-year-old male presented emergency department with pain in the eyes from 's flash burns.  Patient was willing earlier today and after that started having irritation in eyes.  Patient had previous right eye injury and had surgery.  Denies fever chills or nausea vomiting or chest pain or shortness of breath or abdominal pain or weakness or numbness.  Denies any other complaints    Review of patient's allergies indicates:  No Known Allergies  Past Medical History:   Diagnosis Date    Asthma      Past Surgical History:   Procedure Laterality Date    LYMPH NODE BIOPSY      benign    SCLERAL BUCKLING Right 10/31/2018    Procedure: SCLERAL BUCKLING;  Surgeon: SHAVONNE Nevarez MD;  Location: [facility] OR [address] [place] [address];  Service: Ophthalmology;  Laterality: Right;  2.5 hr  240/270 SB  25 gauge PPV     TONSILLECTOMY      VITRECTOMY BY PARS PLANA APPROACH Right 9/3/2018    Procedure: VITRECTOMY, PARS PLANA APPROACH;  Surgeon: SHAVONNE Nevarez MD;  Location: [facility] OR [address] [place] [address];  Service: Ophthalmology;  Laterality: Right;  silicone oil injection, fluid air exchange, endolaser, peripheral iridotomy, ruptured globe revision for ruptured globe for foreign body, giant retinal tear, retinal detachment, endophthalmitis, traumatic cataract    VITRECTOMY BY PARS PLANA APPROACH Right 12/12/2018    Procedure: VITRECTOMY, PARS PLANA APPROACH;  Surgeon: SHAVONNE Nevarez MD;  Location: [facility] OR [address] [place] [address];  Service: Ophthalmology;  Laterality: Right;  25g PPV/PFO/EL/PI/SO OD     Family History   Problem Relation Age of Onset    Cancer Maternal Grandfather         lung cancer, also brain involvement     Social History     Tobacco Use    Smoking status: Every Day     Packs/day: 1.00     Types: " Cigarettes    Smokeless tobacco: Current   Substance Use Topics    Alcohol use: No    Drug use: No     Review of Systems   Constitutional: Negative.    HENT: Negative.     Eyes:  Positive for photophobia, pain, discharge and redness.   Respiratory: Negative.     Cardiovascular: Negative.    Gastrointestinal: Negative.    Endocrine: Negative.    Genitourinary: Negative.    Skin: Negative.    Allergic/Immunologic: Negative.    Neurological: Negative.    Hematological: Negative.    Psychiatric/Behavioral: Negative.     All other systems reviewed and are negative.    Physical Exam     Initial Vitals [11/13/22 2317]   BP Pulse Resp Temp SpO2   (!) 143/91 (!) 113 20 98 °F (36.7 °C) --      MAP       --         Physical Exam    Nursing note and vitals reviewed.  Constitutional: He appears well-developed and well-nourished.   HENT:   Head: Normocephalic and atraumatic.   Nose: Nose normal.   Eyes: EOM are normal.   Right pupil irregular from previous injury and surgery.  Left eye corneal abrasion noted.  Visual acuity otherwise within normal limits however patient does have photophobia when he opens his eyes.  eyes irrigated after exam   Neck: No tracheal deviation present.   Normal range of motion.  Cardiovascular:  Normal rate, regular rhythm, normal heart sounds and intact distal pulses.     Exam reveals no friction rub.       No murmur heard.  Pulmonary/Chest: Breath sounds normal. No respiratory distress. He has no wheezes. He has no rales.   Abdominal: Abdomen is soft. He exhibits no distension. There is no abdominal tenderness.   Musculoskeletal:         General: Normal range of motion.      Cervical back: Normal range of motion.     Neurological: He is alert and oriented to person, place, and time. He has normal strength. GCS score is 15. GCS eye subscore is 4. GCS verbal subscore is 5. GCS motor subscore is 6.   Skin: Skin is warm and dry. Capillary refill takes less than 2 seconds.   Psychiatric: He has a normal  mood and affect. Thought content normal.       ED Course   Procedures  Labs Reviewed - No data to display       Imaging Results    None          Medications   erythromycin 5 mg/gram (0.5 %) ophthalmic ointment (has no administration in time range)   oxyCODONE-acetaminophen 5-325 mg per tablet 2 tablet (has no administration in time range)   fluorescein ophthalmic strip 1 each (1 each Left Eye Given by Provider 11/13/22 8389)   TETRAcaine HCl (PF) 0.5 % Drop 2 drop (2 drops Left Eye Given by Provider 11/13/22 9586)     Medical Decision Making:   Differential Diagnosis:   Patient flash burns in the eyes secondary to welding.  Patient did have improvement of symptoms with treatment.  Topical antibiotic placed and pain control.  Discharged with instructions and follow-up and return precautions given.  Tetracaine drops placed in the eye and patient did have relief.                        Clinical Impression:   Final diagnoses:  [S05.02XA] Abrasion of left cornea, initial encounter (Primary)  [H16.9] Keratitis        ED Disposition Condition    Discharge Stable          ED Prescriptions       Medication Sig Dispense Start Date End Date Auth. Provider    erythromycin (ROMYCIN) ophthalmic ointment Place a 1/2 inch ribbon of ointment into the lower eyelid. 3.5 g 11/14/2022 -- Ramiro Cason MD    oxyCODONE-acetaminophen (PERCOCET) 5-325 mg per tablet Take 1 tablet by mouth every 4 (four) hours as needed for Pain. 12 tablet 11/14/2022 -- Ramiro Cason MD          Follow-up Information       Follow up With Specialties Details Why Contact Info    [facility] In 2 days  [address] [facility] [address]  [phone]               Ramiro Cason MD  11/14/22 0031

## 2022-11-14 NOTE — TELEPHONE ENCOUNTER
----- Message from Daniellejennifer Sheldon sent at 11/14/2022  9:54 AM CST -----  Regarding: FW: appt needed  Hey Dr Nevarez said can someone call this pt and get him in to see someone in [place].  Thanks    ----- Message -----  From: Rosalia Guerra  Sent: 11/14/2022   9:03 AM CST  To: [facility] Staff  Subject: appt needed                                      Name of Who is Calling: JEREMY MARCUS [[id number]]      What is the request in detail: Pt needs an appt as soon as possible. 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 Needs advise. Please advise.       Can the clinic reply by [facility]: no       What Number to Call Back if not in [facility]: [phone]

## 2022-11-14 NOTE — DISCHARGE INSTRUCTIONS
Rest.  Return to emergency department for worsening symptoms or any problems.  Follow-up with your ophthalmologist tomorrow for re-evaluation.  Return for any problems.

## 2024-01-09 ENCOUNTER — HOSPITAL ENCOUNTER (EMERGENCY)
Facility: HOSPITAL | Age: 29
Discharge: HOME | End: 2024-01-09
Attending: EMERGENCY MEDICINE
Payer: MEDICAID

## 2024-01-09 VITALS
WEIGHT: 238 LBS | DIASTOLIC BLOOD PRESSURE: 68 MMHG | TEMPERATURE: 98 F | HEART RATE: 90 BPM | BODY MASS INDEX: 28.1 KG/M2 | OXYGEN SATURATION: 97 % | HEIGHT: 77 IN | SYSTOLIC BLOOD PRESSURE: 133 MMHG | RESPIRATION RATE: 17 BRPM

## 2024-01-09 DIAGNOSIS — S69.92XA: Primary | ICD-10-CM
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
## 2024-01-09 PROCEDURE — 63600175 PHARM REV CODE 636 W HCPCS: Performed by: EMERGENCY MEDICINE

## 2024-01-09 PROCEDURE — 99284 EMERGENCY DEPT VISIT MOD MDM: CPT

## 2024-01-09 PROCEDURE — 96372 THER/PROPH/DIAG INJ SC/IM: CPT | Performed by: EMERGENCY MEDICINE

## 2024-01-09 RX ORDER — CEFAZOLIN SODIUM 1 G/3ML
1 INJECTION, POWDER, FOR SOLUTION INTRAMUSCULAR; INTRAVENOUS
Status: COMPLETED | OUTPATIENT
Start: 2024-01-09 | End: 2024-01-09

## 2024-01-09 RX ORDER — CEPHALEXIN 500 MG/1
500 CAPSULE ORAL 4 TIMES DAILY
Qty: 20 CAPSULE | Refills: 0 | Status: SHIPPED | OUTPATIENT
Start: 2024-01-09 | End: 2024-01-14

## 2024-01-09 RX ORDER — DICLOFENAC SODIUM 75 MG/1
75 TABLET, DELAYED RELEASE ORAL 2 TIMES DAILY
Qty: 14 TABLET | Refills: 0 | Status: SHIPPED | OUTPATIENT
Start: 2024-01-09

## 2024-01-09 RX ADMIN — CEFAZOLIN 1 G: 330 INJECTION, POWDER, FOR SOLUTION INTRAMUSCULAR; INTRAVENOUS at 10:01

## 2024-01-10 NOTE — ED PROVIDER NOTES
Encounter Date: 1/9/2024       History     Chief Complaint   Patient presents with    Finger Injury     Pt had a nail in finger this morning.  Tetanus 1 year ago.     Patient states that he injured his index finger with a nail gun this morning states shot a finishing nail through the finger at the D IP states he went all the way through he has pain on the opposite side of the puncture wound pain with range of motion there is no redness to the areas no fever he was able to remove the nail himself last tetanus shot was a year ago        Review of patient's allergies indicates:  No Known Allergies  Past Medical History:   Diagnosis Date    Asthma      Past Surgical History:   Procedure Laterality Date    LYMPH NODE BIOPSY      benign    SCLERAL BUCKLING Right 10/31/2018    Procedure: SCLERAL BUCKLING;  Surgeon: SHAVONNE Nevarez MD;  Location: [facility] OR [address] [place] [address];  Service: Ophthalmology;  Laterality: Right;  2.5 hr  240/270 SB  25 gauge PPV     TONSILLECTOMY      VITRECTOMY BY PARS PLANA APPROACH Right 9/3/2018    Procedure: VITRECTOMY, PARS PLANA APPROACH;  Surgeon: SHAVONNE Nevarez MD;  Location: [facility] OR [address] [place] [address];  Service: Ophthalmology;  Laterality: Right;  silicone oil injection, fluid air exchange, endolaser, peripheral iridotomy, ruptured globe revision for ruptured globe for foreign body, giant retinal tear, retinal detachment, endophthalmitis, traumatic cataract    VITRECTOMY BY PARS PLANA APPROACH Right 12/12/2018    Procedure: VITRECTOMY, PARS PLANA APPROACH;  Surgeon: SHAVONNE Nevarez MD;  Location: [facility] OR [address] [place] [address];  Service: Ophthalmology;  Laterality: Right;  25g PPV/PFO/EL/PI/SO OD     Family History   Problem Relation Age of Onset    Cancer Maternal Grandfather         lung cancer, also brain involvement     Social History     Tobacco Use    Smoking status: Every Day     Current packs/day: 1.00     Types: Cigarettes    Smokeless tobacco: Current   Substance Use Topics    Alcohol use: No    Drug  use: No     Review of Systems   Musculoskeletal:  Positive for arthralgias.   Skin:  Positive for wound.   All other systems reviewed and are negative.      Physical Exam     Initial Vitals [01/09/24 2037]   BP Pulse Resp Temp SpO2   (!) 144/86 89 16 97.6 °F (36.4 °C) 97 %      MAP       --         Physical Exam    Constitutional: He appears well-developed and well-nourished. No distress.   HENT:   Head: Normocephalic and atraumatic.   Cardiovascular:  Normal rate, regular rhythm and intact distal pulses.           Pulmonary/Chest: No respiratory distress.   Musculoskeletal:         General: Tenderness present.      Comments: Puncture wound to the palmar aspect of the left index finger without active bleeding no erythema there is tenderness to palpation along the mid phalanx and distal phalanx decreased range of motion no significant swelling noted     Neurological: He is alert and oriented to person, place, and time. No sensory deficit. GCS score is 15. GCS eye subscore is 4. GCS verbal subscore is 5. GCS motor subscore is 6.   Skin: Skin is warm and dry. Capillary refill takes less than 2 seconds. No erythema.   Puncture wound as above         ED Course   Procedures  Labs Reviewed - No data to display       Imaging Results              X-Ray Finger 2 or More Views Left (In process)                      Medications   ceFAZolin injection 1 g (1 g Intramuscular Given 1/9/24 2206)     Medical Decision Making  X-ray does show possible chip fracture I have given patient a dose of Ancef in the emergency department will place him on Keflex and refer him to Dr. Pool for re-evaluation call Dr. Pool's clinic in the morning return to the ER for any worsening symptoms or new symptoms including swelling redness to the finger decreased range of motion fever or other concerns    Amount and/or Complexity of Data Reviewed  Radiology: ordered. Decision-making details documented in ED Course.    Risk  Prescription drug  management.                                      Clinical Impression:  Final diagnoses:  [S69.92XA, W29.4XXA] Injury of finger by nail gun, left, initial encounter (Primary)          ED Disposition Condition    Discharge Stable          ED Prescriptions       Medication Sig Dispense Start Date End Date Auth. Provider    cephALEXin (KEFLEX) 500 MG capsule Take 1 capsule (500 mg total) by mouth 4 (four) times daily. for 5 days 20 capsule 1/9/2024 1/14/2024 Bruno Weeks MD    diclofenac (VOLTAREN) 75 MG EC tablet Take 1 tablet (75 mg total) by mouth 2 (two) times daily. 14 tablet 1/9/2024 -- Bruno Weeks MD          Follow-up Information    None          Bruno Weeks MD  01/09/24 9121

## 2024-02-01 ENCOUNTER — TELEPHONE (OUTPATIENT)
Dept: FAMILY MEDICINE | Facility: CLINIC | Age: 29
End: 2024-02-01
Payer: MEDICAID

## 2024-02-01 ENCOUNTER — OFFICE VISIT (OUTPATIENT)
Dept: FAMILY MEDICINE | Facility: CLINIC | Age: 29
End: 2024-02-01
Payer: MEDICAID

## 2024-02-01 VITALS
HEART RATE: 87 BPM | BODY MASS INDEX: 30.07 KG/M2 | SYSTOLIC BLOOD PRESSURE: 120 MMHG | WEIGHT: 254.63 LBS | TEMPERATURE: 99 F | DIASTOLIC BLOOD PRESSURE: 78 MMHG | OXYGEN SATURATION: 97 % | HEIGHT: 77 IN

## 2024-02-01 DIAGNOSIS — J06.9 ACUTE URI: ICD-10-CM

## 2024-02-01 DIAGNOSIS — U07.1 COVID-19: Primary | ICD-10-CM

## 2024-02-01 PROCEDURE — 99214 OFFICE O/P EST MOD 30 MIN: CPT | Mod: PBBFAC,PO | Performed by: FAMILY MEDICINE

## 2024-02-01 PROCEDURE — 3078F DIAST BP <80 MM HG: CPT | Mod: CPTII,,, | Performed by: FAMILY MEDICINE

## 2024-02-01 PROCEDURE — 99213 OFFICE O/P EST LOW 20 MIN: CPT | Mod: S$PBB,,, | Performed by: FAMILY MEDICINE

## 2024-02-01 PROCEDURE — 3074F SYST BP LT 130 MM HG: CPT | Mod: CPTII,,, | Performed by: FAMILY MEDICINE

## 2024-02-01 PROCEDURE — 1160F RVW MEDS BY RX/DR IN RCRD: CPT | Mod: CPTII,,, | Performed by: FAMILY MEDICINE

## 2024-02-01 PROCEDURE — 3008F BODY MASS INDEX DOCD: CPT | Mod: CPTII,,, | Performed by: FAMILY MEDICINE

## 2024-02-01 PROCEDURE — 1159F MED LIST DOCD IN RCRD: CPT | Mod: CPTII,,, | Performed by: FAMILY MEDICINE

## 2024-02-01 PROCEDURE — 99999 PR PBB SHADOW E&M-EST. PATIENT-LVL IV: CPT | Mod: PBBFAC,,, | Performed by: FAMILY MEDICINE

## 2024-02-01 RX ORDER — PROMETHAZINE HYDROCHLORIDE AND DEXTROMETHORPHAN HYDROBROMIDE 6.25; 15 MG/5ML; MG/5ML
5 SYRUP ORAL 4 TIMES DAILY PRN
Qty: 240 ML | Refills: 0 | Status: SHIPPED | OUTPATIENT
Start: 2024-02-01

## 2024-02-01 NOTE — TELEPHONE ENCOUNTER
----- Message from Giovanna Limon, Patient Care Assistant sent at 2/1/2024  8:57 AM CST -----  Regarding: appointment  Contact: Sharon pt's wife  Type:  Sooner Appointment Request    Caller is requesting a sooner appointment.  Caller declined first available appointment listed below.  Caller will not accept being placed on the waitlist and is requesting a message be sent to doctor.    Name of Caller:  Sharon pt's wife    When is the first available appointment?  2024    Symptoms:  flu     Would the patient rather a call back or a response via [facility]? callback    Best Call Back Number:  [phone] (home)     Additional Information:  please call Sharon pt's wife to advise. Thanks!

## 2024-02-01 NOTE — PATIENT INSTRUCTIONS
COVID vitamins:  Vitamin C 1,000 mg three times a day  Vitamin D3 5,000 IU once a day  (you should take this for life as well)  Zinc 50 mg twice a day    Aspirin 81 mg a day (clot prevention) - for 2 weeks    Push fluid intake - plenty of water.

## 2024-02-01 NOTE — PROGRESS NOTES
Subjective:       Patient ID: Nirmal Oglesby is a 28 y.o. male.    Chief Complaint: No chief complaint on file.    Patient here for UC visit.  Onset yesterday of ST, cough, runny nose, fatigue, aches and HA and nausea.  Flu negative and Covid positive here today.  Risk score is 1       Review of Systems   Constitutional:  Positive for chills and fatigue. Negative for fever.   HENT:  Positive for congestion, rhinorrhea and sore throat.    Respiratory:  Positive for cough. Negative for shortness of breath and wheezing.    Cardiovascular:  Negative for chest pain.   Gastrointestinal:  Positive for nausea. Negative for abdominal pain.   Skin:  Negative for rash.   Neurological:  Negative for numbness.   All other systems reviewed and are negative.      Objective:      Physical Exam  Vitals reviewed.   Constitutional:       General: He is not in acute distress.     Comments: Televisit - pt is COVID positive.   HENT:      Nose: Congestion present.      Mouth/Throat:      Comments: Nml voice/speech  Pulmonary:      Effort: No respiratory distress.   Neurological:      Mental Status: He is alert.      Comments: Alert and interactive   Psychiatric:         Mood and Affect: Mood normal.         Thought Content: Thought content normal.         Assessment:       1. COVID-19    2. Acute URI        Plan:       COVID-19  -     promethazine-dextromethorphan (PROMETHAZINE-DM) 6.25-15 mg/5 mL Syrp; Take 5 mLs by mouth 4 (four) times daily as needed (cough).  Dispense: 240 mL; Refill: 0    Acute URI  -     POCT Influenza A/B Molecular  -     POCT COVID-19 Rapid Screening      Patient Instructions   COVID vitamins:  Vitamin C 1,000 mg three times a day  Vitamin D3 5,000 IU once a day  (you should take this for life as well)  Zinc 50 mg twice a day    Aspirin 81 mg a day (clot prevention) - for 2 weeks    Push fluid intake - plenty of water.

## 2024-02-01 NOTE — LETTER
February 1, 2024      [facility]  [address] [address]  Phone: [phone]  Fax: [phone]       Patient: Nirmal Oglesby   YOB: 1995  Date of Visit: 02/01/2024    To Whom It May Concern:    Patrice Oglesby  was at [facility] on 02/01/2024. The patient may return to work/school on 2/5/2024 with no restrictions. If you have any questions or concerns, or if I can be of further assistance, please do not hesitate to contact me.    Sincerely,    Tyler Estevez MD

## 2025-01-06 ENCOUNTER — HOSPITAL ENCOUNTER (EMERGENCY)
Facility: HOSPITAL | Age: 30
Discharge: HOME | End: 2025-01-06
Attending: STUDENT IN AN ORGANIZED HEALTH CARE EDUCATION/TRAINING PROGRAM

## 2025-01-06 VITALS
HEART RATE: 88 BPM | TEMPERATURE: 98 F | RESPIRATION RATE: 16 BRPM | BODY MASS INDEX: 29.52 KG/M2 | WEIGHT: 250 LBS | HEIGHT: 77 IN | OXYGEN SATURATION: 99 % | DIASTOLIC BLOOD PRESSURE: 68 MMHG | SYSTOLIC BLOOD PRESSURE: 144 MMHG

## 2025-01-06 DIAGNOSIS — L02.91 ABSCESS: Primary | ICD-10-CM

## 2025-01-06 PROCEDURE — 99284 EMERGENCY DEPT VISIT MOD MDM: CPT

## 2025-01-06 PROCEDURE — 25000003 PHARM REV CODE 250: Performed by: NURSE PRACTITIONER

## 2025-01-06 RX ORDER — DOXYCYCLINE 100 MG/1
100 CAPSULE ORAL 2 TIMES DAILY
Qty: 14 CAPSULE | Refills: 0 | Status: SHIPPED | OUTPATIENT
Start: 2025-01-06 | End: 2025-01-13

## 2025-01-06 RX ORDER — SULFAMETHOXAZOLE AND TRIMETHOPRIM 800; 160 MG/1; MG/1
1 TABLET ORAL
Status: DISCONTINUED | OUTPATIENT
Start: 2025-01-06 | End: 2025-01-06

## 2025-01-06 RX ORDER — CIPROFLOXACIN 500 MG/1
500 TABLET ORAL EVERY 12 HOURS
Qty: 14 TABLET | Refills: 0 | Status: SHIPPED | OUTPATIENT
Start: 2025-01-06 | End: 2025-01-13

## 2025-01-06 RX ORDER — DOXYCYCLINE 100 MG/1
100 CAPSULE ORAL EVERY 12 HOURS
Status: DISCONTINUED | OUTPATIENT
Start: 2025-01-06 | End: 2025-01-06 | Stop reason: HOSPADM

## 2025-01-06 RX ORDER — CIPROFLOXACIN 500 MG/1
500 TABLET ORAL
Status: COMPLETED | OUTPATIENT
Start: 2025-01-06 | End: 2025-01-06

## 2025-01-06 RX ORDER — MUPIROCIN 20 MG/G
OINTMENT TOPICAL 2 TIMES DAILY
Qty: 22 G | Refills: 1 | Status: SHIPPED | OUTPATIENT
Start: 2025-01-06

## 2025-01-06 RX ADMIN — CIPROFLOXACIN 500 MG: 500 TABLET ORAL at 05:01

## 2025-01-06 RX ADMIN — DOXYCYCLINE HYCLATE 100 MG: 100 CAPSULE ORAL at 05:01

## 2025-01-07 NOTE — ED PROVIDER NOTES
"Encounter Date: 1/6/2025       History     Chief Complaint   Patient presents with    Abscess     Abscess to right ear x4 days.  "Has tried to pop it but nothing will come out"       Nirmal Oglesby is a 29 year old male presenting to the ED with pain/swelling to the right ear. He began having symptoms 3-4 days ago with no fever. He reports attempting to squeeze the area but had no drainage.       Review of patient's allergies indicates:  No Known Allergies  Past Medical History:   Diagnosis Date    Asthma      Past Surgical History:   Procedure Laterality Date    LYMPH NODE BIOPSY      benign    SCLERAL BUCKLING Right 10/31/2018    Procedure: SCLERAL BUCKLING;  Surgeon: SHAVONNE Nevarez MD;  Location: [facility] OR [address] [place] [address];  Service: Ophthalmology;  Laterality: Right;  2.5 hr  240/270 SB  25 gauge PPV     TONSILLECTOMY      VITRECTOMY BY PARS PLANA APPROACH Right 9/3/2018    Procedure: VITRECTOMY, PARS PLANA APPROACH;  Surgeon: SHAVONNE Nevarez MD;  Location: [facility] OR [address] [place] [address];  Service: Ophthalmology;  Laterality: Right;  silicone oil injection, fluid air exchange, endolaser, peripheral iridotomy, ruptured globe revision for ruptured globe for foreign body, giant retinal tear, retinal detachment, endophthalmitis, traumatic cataract    VITRECTOMY BY PARS PLANA APPROACH Right 12/12/2018    Procedure: VITRECTOMY, PARS PLANA APPROACH;  Surgeon: SHAVONNE Nevarez MD;  Location: [facility] OR [address] [place] [address];  Service: Ophthalmology;  Laterality: Right;  25g PPV/PFO/EL/PI/SO OD     Family History   Problem Relation Name Age of Onset    Cancer Maternal Grandfather          lung cancer, also brain involvement     Social History     Tobacco Use    Smoking status: Every Day     Current packs/day: 1.00     Types: Cigarettes    Smokeless tobacco: Current   Substance Use Topics    Alcohol use: No    Drug use: No     Review of Systems   Constitutional:  Negative for fever.   HENT:  Positive for ear pain. Negative for sore throat.  "   Respiratory:  Negative for shortness of breath.    Cardiovascular:  Negative for chest pain.   Gastrointestinal:  Negative for nausea.   Genitourinary:  Negative for dysuria.   Musculoskeletal:  Negative for back pain.   Skin:  Negative for rash.   Neurological:  Negative for weakness.   Hematological:  Does not bruise/bleed easily.       Physical Exam     Initial Vitals [01/06/25 1518]   BP Pulse Resp Temp SpO2   (!) 160/94 94 18 98 °F (36.7 °C) 99 %      MAP       --         Physical Exam    Nursing note and vitals reviewed.  Constitutional: He appears well-developed and well-nourished. He is not diaphoretic. No distress.   HENT:   Head: Normocephalic and atraumatic.   Ears:  Mouth/Throat: Oropharynx is clear and moist.   Eyes: Conjunctivae are normal.   Neck: Neck supple.   Cardiovascular:  Normal rate, regular rhythm, normal heart sounds and intact distal pulses.     Exam reveals no gallop and no friction rub.       No murmur heard.  Pulmonary/Chest: Breath sounds normal. He has no wheezes. He has no rhonchi. He has no rales.   Abdominal: Abdomen is soft. He exhibits no distension. There is no abdominal tenderness.   Musculoskeletal:         General: Normal range of motion.      Cervical back: Neck supple.     Neurological: He is alert and oriented to person, place, and time.   Skin: No rash noted. No erythema.         ED Course   Procedures  Labs Reviewed - No data to display       Imaging Results    None          Medications   ciprofloxacin HCl tablet 500 mg (500 mg Oral Given 1/6/25 3099)     Medical Decision Making  This is an urgent evaluation of a 29 year old male presenting to the ED with a small abscess to the david helix of the right ear. There is no erythema to the ear. The small abscess is over cartilage and I discussed with the patient the close proximity of cartilage and risk of I&D. Will start doxycycline and cipro to cover prophylactically for perichondritis and have him apply warm compresses.  Instructed him to return to the ED for any worsening symptoms. Based on my clinical evaluation, I do not appreciate any immediate, emergent, or life threatening condition or etiology that warrants additional workup today and feel that the patient can be discharged with close follow up care.       Risk  Prescription drug management.                                      Clinical Impression:  Final diagnoses:  [L02.91] Abscess (Primary)          ED Disposition Condition    Discharge Stable          ED Prescriptions       Medication Sig Dispense Start Date End Date Auth. Provider    ciprofloxacin HCl (CIPRO) 500 MG tablet Take 1 tablet (500 mg total) by mouth every 12 (twelve) hours. for 7 days 14 tablet 1/6/2025 1/13/2025 Ivonne Dumont NP    doxycycline (VIBRAMYCIN) 100 MG Cap Take 1 capsule (100 mg total) by mouth 2 (two) times daily. for 7 days 14 capsule 1/6/2025 1/13/2025 Ivonne Dumont NP    mupirocin (BACTROBAN) 2 % ointment Apply topically 2 (two) times daily. 22 g 1/6/2025 -- Ivonne Dumont NP          Follow-up Information       Follow up With Specialties Details Why Contact Info Additional Information    [facility] - Emergency Dept Emergency Medicine  As needed, If symptoms worsen [address] [facility] [id number] [phone] [address] [place] [address]  Schedule an appointment as soon as possible for a visit   [facility] [address]  [phone]                Ivonne Dumont NP  01/06/25 0776

## (undated) DEVICE — CORD BPLR SGL USE DISP STRL

## (undated) DEVICE — NDL 22GA X1 1/2 REG BEVEL

## (undated) DEVICE — DRESSING EYE OVAL LF

## (undated) DEVICE — SHEILD & GARTERS FOX METAL EYE

## (undated) DEVICE — COVER MAYO STAND REINFRCD 30

## (undated) DEVICE — CONTAINER SPECIMEN STRL 4OZ

## (undated) DEVICE — SOL GONAK

## (undated) DEVICE — SUT 7/0 18IN COATED VICRYL

## (undated) DEVICE — FORCEP GRASPING 25GA SMOOTH

## (undated) DEVICE — KNIVE V-LANCE BLADE 20GA

## (undated) DEVICE — SOL WATER STRL IRR 1000ML

## (undated) DEVICE — SYR 1CC TB SG 27GX1/2

## (undated) DEVICE — PACK TOTAL PLUS 25G VITRECTOMY

## (undated) DEVICE — SYR 10CC LUER LOCK

## (undated) DEVICE — SEE MEDLINE ITEM 157131

## (undated) DEVICE — SHIELD EYE METAL FOX 50/BX

## (undated) DEVICE — OIL SILICONE

## (undated) DEVICE — SILICON 1000 8.5 ML

## (undated) DEVICE — TRAY MUSCLE LID EYE

## (undated) DEVICE — SOL BETADINE 5%

## (undated) DEVICE — Device

## (undated) DEVICE — KIT GREY EYE

## (undated) DEVICE — KIT PERFLUOROCARBON LIQUID

## (undated) DEVICE — PACK AUTO GAS FILL

## (undated) DEVICE — CANNULA 23GA SOFT-TIP

## (undated) DEVICE — CANNULA OPTHALMIC SOF TIP 25G

## (undated) DEVICE — PROBE ILLUM FLEX CURVE LASER

## (undated) DEVICE — PACK INJ VISC FLD 20G/23G SIL

## (undated) DEVICE — SYRINGE 30CC LL W/O NDL

## (undated) DEVICE — FORCEP GRIESHABER MAXGRIP 25G

## (undated) DEVICE — SOL BALANCED SALT 500ML

## (undated) DEVICE — SEE MEDLINE ITEM 146372

## (undated) DEVICE — SOL BSS BALANCED SALT

## (undated) DEVICE — CORD FOR BIPOLAR FORCEPS 12

## (undated) DEVICE — HOLDER TUBE

## (undated) DEVICE — SYR DISP LL 5CC

## (undated) DEVICE — LENS VITRCTMY OPHTH 30DEG 59DE

## (undated) DEVICE — BACKFLUSH 25GA SOFT-TIP DISP

## (undated) DEVICE — PENCIL BIPOLAR 25G STR TIP

## (undated) DEVICE — SUT 7-0 VICRYL 18 TG160-8

## (undated) DEVICE — KNIFE OPHTH MICRO UNITOME 5MM

## (undated) DEVICE — CLOSURE SKIN STERI STRIP 1/2X4

## (undated) DEVICE — GOWN SURGICAL X-LARGE

## (undated) DEVICE — CANNULA DUALBORE SIDEFLO 25G